# Patient Record
Sex: MALE
[De-identification: names, ages, dates, MRNs, and addresses within clinical notes are randomized per-mention and may not be internally consistent; named-entity substitution may affect disease eponyms.]

---

## 2017-08-03 ENCOUNTER — MEDICATION RENEWAL (OUTPATIENT)
Age: 37
End: 2017-08-03

## 2017-08-08 ENCOUNTER — LABORATORY RESULT (OUTPATIENT)
Age: 37
End: 2017-08-08

## 2017-08-08 ENCOUNTER — APPOINTMENT (OUTPATIENT)
Dept: INTERNAL MEDICINE | Facility: CLINIC | Age: 37
End: 2017-08-08
Payer: COMMERCIAL

## 2017-08-08 VITALS
WEIGHT: 191.6 LBS | HEIGHT: 71 IN | TEMPERATURE: 98 F | BODY MASS INDEX: 26.82 KG/M2 | DIASTOLIC BLOOD PRESSURE: 92 MMHG | SYSTOLIC BLOOD PRESSURE: 130 MMHG | OXYGEN SATURATION: 98 % | HEART RATE: 99 BPM

## 2017-08-08 DIAGNOSIS — H92.20 OTORRHAGIA, UNSPECIFIED EAR: ICD-10-CM

## 2017-08-08 DIAGNOSIS — Z86.69 PERSONAL HISTORY OF OTHER DISEASES OF THE NERVOUS SYSTEM AND SENSE ORGANS: ICD-10-CM

## 2017-08-08 PROCEDURE — 36415 COLL VENOUS BLD VENIPUNCTURE: CPT

## 2017-08-08 PROCEDURE — 99214 OFFICE O/P EST MOD 30 MIN: CPT | Mod: 25

## 2017-08-11 LAB
25(OH)D3 SERPL-MCNC: 19.2 NG/ML
ALBUMIN SERPL ELPH-MCNC: 4.6 G/DL
ALP BLD-CCNC: 71 U/L
ALT SERPL-CCNC: 71 U/L
ANION GAP SERPL CALC-SCNC: 20 MMOL/L
AST SERPL-CCNC: 34 U/L
BASOPHILS # BLD AUTO: 0.03 K/UL
BASOPHILS NFR BLD AUTO: 0.5 %
BILIRUB SERPL-MCNC: 0.7 MG/DL
BUN SERPL-MCNC: 21 MG/DL
CALCIUM SERPL-MCNC: 10.1 MG/DL
CHLORIDE SERPL-SCNC: 100 MMOL/L
CHOLEST SERPL-MCNC: 261 MG/DL
CHOLEST/HDLC SERPL: 3.5 RATIO
CO2 SERPL-SCNC: 19 MMOL/L
CREAT SERPL-MCNC: 0.98 MG/DL
EOSINOPHIL # BLD AUTO: 0.04 K/UL
EOSINOPHIL NFR BLD AUTO: 0.7 %
FOLATE SERPL-MCNC: 9.6 NG/ML
GLUCOSE SERPL-MCNC: 150 MG/DL
HBA1C MFR BLD HPLC: 5.5 %
HCT VFR BLD CALC: 45.3 %
HDLC SERPL-MCNC: 75 MG/DL
HGB BLD-MCNC: 14.9 G/DL
IMM GRANULOCYTES NFR BLD AUTO: 0.2 %
LDLC SERPL CALC-MCNC: NORMAL
LYMPHOCYTES # BLD AUTO: 1.1 K/UL
LYMPHOCYTES NFR BLD AUTO: 19.5 %
MAN DIFF?: NORMAL
MCHC RBC-ENTMCNC: 31.6 PG
MCHC RBC-ENTMCNC: 32.9 GM/DL
MCV RBC AUTO: 96 FL
MONOCYTES # BLD AUTO: 0.34 K/UL
MONOCYTES NFR BLD AUTO: 6 %
NEUTROPHILS # BLD AUTO: 4.11 K/UL
NEUTROPHILS NFR BLD AUTO: 73.1 %
PLATELET # BLD AUTO: 172 K/UL
POTASSIUM SERPL-SCNC: 4.1 MMOL/L
PROT SERPL-MCNC: 6.9 G/DL
RBC # BLD: 4.72 M/UL
RBC # FLD: 13.7 %
SODIUM SERPL-SCNC: 139 MMOL/L
T PALLIDUM AB SER QL IA: NEGATIVE
TRIGL SERPL-MCNC: 411 MG/DL
VIT B12 SERPL-MCNC: 353 PG/ML
WBC # FLD AUTO: 5.63 K/UL

## 2017-09-06 ENCOUNTER — MEDICATION RENEWAL (OUTPATIENT)
Age: 37
End: 2017-09-06

## 2017-11-22 ENCOUNTER — MEDICATION RENEWAL (OUTPATIENT)
Age: 37
End: 2017-11-22

## 2017-11-27 ENCOUNTER — MEDICATION RENEWAL (OUTPATIENT)
Age: 37
End: 2017-11-27

## 2018-01-16 ENCOUNTER — TRANSCRIPTION ENCOUNTER (OUTPATIENT)
Age: 38
End: 2018-01-16

## 2018-01-16 ENCOUNTER — NON-APPOINTMENT (OUTPATIENT)
Age: 38
End: 2018-01-16

## 2018-01-16 ENCOUNTER — APPOINTMENT (OUTPATIENT)
Dept: INTERNAL MEDICINE | Facility: CLINIC | Age: 38
End: 2018-01-16
Payer: COMMERCIAL

## 2018-01-16 VITALS
WEIGHT: 191 LBS | SYSTOLIC BLOOD PRESSURE: 126 MMHG | TEMPERATURE: 97.7 F | DIASTOLIC BLOOD PRESSURE: 96 MMHG | BODY MASS INDEX: 26.74 KG/M2 | HEART RATE: 92 BPM | OXYGEN SATURATION: 98 % | HEIGHT: 71 IN

## 2018-01-16 DIAGNOSIS — K64.4 RESIDUAL HEMORRHOIDAL SKIN TAGS: ICD-10-CM

## 2018-01-16 PROCEDURE — 36415 COLL VENOUS BLD VENIPUNCTURE: CPT

## 2018-01-16 PROCEDURE — 99395 PREV VISIT EST AGE 18-39: CPT | Mod: 25

## 2018-01-16 PROCEDURE — 93000 ELECTROCARDIOGRAM COMPLETE: CPT

## 2018-01-16 PROCEDURE — 92552 PURE TONE AUDIOMETRY AIR: CPT

## 2018-01-16 PROCEDURE — G0444 DEPRESSION SCREEN ANNUAL: CPT

## 2018-01-19 LAB
25(OH)D3 SERPL-MCNC: 17 NG/ML
ALBUMIN SERPL ELPH-MCNC: 4.3 G/DL
ALP BLD-CCNC: 68 U/L
ALT SERPL-CCNC: 66 U/L
ANION GAP SERPL CALC-SCNC: 19 MMOL/L
APPEARANCE: CLEAR
AST SERPL-CCNC: 52 U/L
BASOPHILS # BLD AUTO: 0.03 K/UL
BASOPHILS NFR BLD AUTO: 0.6 %
BILIRUB SERPL-MCNC: 0.6 MG/DL
BILIRUBIN URINE: NEGATIVE
BLOOD URINE: NEGATIVE
BUN SERPL-MCNC: 16 MG/DL
CALCIUM SERPL-MCNC: 9.6 MG/DL
CHLORIDE SERPL-SCNC: 106 MMOL/L
CHOLEST SERPL-MCNC: 230 MG/DL
CHOLEST/HDLC SERPL: 3.2 RATIO
CO2 SERPL-SCNC: 19 MMOL/L
COLOR: YELLOW
CREAT SERPL-MCNC: 0.88 MG/DL
EOSINOPHIL # BLD AUTO: 0.1 K/UL
EOSINOPHIL NFR BLD AUTO: 1.9 %
GLUCOSE QUALITATIVE U: NEGATIVE
GLUCOSE SERPL-MCNC: 102 MG/DL
HBA1C MFR BLD HPLC: 5.5 %
HCT VFR BLD CALC: 46.1 %
HCV AB SER QL: NONREACTIVE
HCV S/CO RATIO: 0.1 S/CO
HDLC SERPL-MCNC: 73 MG/DL
HGB BLD-MCNC: 14.7 G/DL
IMM GRANULOCYTES NFR BLD AUTO: 0.2 %
KETONES URINE: NEGATIVE
LDLC SERPL CALC-MCNC: 112 MG/DL
LEUKOCYTE ESTERASE URINE: NEGATIVE
LYMPHOCYTES # BLD AUTO: 1.51 K/UL
LYMPHOCYTES NFR BLD AUTO: 28 %
MAN DIFF?: NORMAL
MCHC RBC-ENTMCNC: 31.5 PG
MCHC RBC-ENTMCNC: 31.9 GM/DL
MCV RBC AUTO: 98.7 FL
MONOCYTES # BLD AUTO: 0.49 K/UL
MONOCYTES NFR BLD AUTO: 9.1 %
NEUTROPHILS # BLD AUTO: 3.26 K/UL
NEUTROPHILS NFR BLD AUTO: 60.2 %
NITRITE URINE: NEGATIVE
PH URINE: 6
PLATELET # BLD AUTO: 174 K/UL
POTASSIUM SERPL-SCNC: 3.9 MMOL/L
PROT SERPL-MCNC: 6.7 G/DL
PROTEIN URINE: NEGATIVE
PSA SERPL-MCNC: 0.46 NG/ML
RBC # BLD: 4.67 M/UL
RBC # FLD: 14.4 %
SODIUM SERPL-SCNC: 144 MMOL/L
SPECIFIC GRAVITY URINE: 1.02
T4 FREE SERPL-MCNC: 0.8 NG/DL
TRIGL SERPL-MCNC: 224 MG/DL
TSH SERPL-ACNC: 1.81 UIU/ML
UROBILINOGEN URINE: NEGATIVE
VIT B12 SERPL-MCNC: 416 PG/ML
WBC # FLD AUTO: 5.4 K/UL

## 2018-08-28 ENCOUNTER — MEDICATION RENEWAL (OUTPATIENT)
Age: 38
End: 2018-08-28

## 2018-08-31 ENCOUNTER — APPOINTMENT (OUTPATIENT)
Dept: INTERNAL MEDICINE | Facility: CLINIC | Age: 38
End: 2018-08-31
Payer: COMMERCIAL

## 2018-08-31 VITALS
TEMPERATURE: 98.2 F | SYSTOLIC BLOOD PRESSURE: 134 MMHG | DIASTOLIC BLOOD PRESSURE: 96 MMHG | HEART RATE: 98 BPM | HEIGHT: 71 IN | OXYGEN SATURATION: 99 % | WEIGHT: 191 LBS | BODY MASS INDEX: 26.74 KG/M2

## 2018-08-31 DIAGNOSIS — H69.81 OTHER SPECIFIED DISORDERS OF EUSTACHIAN TUBE, RIGHT EAR: ICD-10-CM

## 2018-08-31 DIAGNOSIS — J30.2 OTHER SEASONAL ALLERGIC RHINITIS: ICD-10-CM

## 2018-08-31 PROCEDURE — 99214 OFFICE O/P EST MOD 30 MIN: CPT | Mod: 25

## 2018-08-31 PROCEDURE — 36415 COLL VENOUS BLD VENIPUNCTURE: CPT

## 2018-08-31 RX ORDER — HYDROCHLOROTHIAZIDE 12.5 MG/1
12.5 CAPSULE ORAL DAILY
Qty: 30 | Refills: 6 | Status: DISCONTINUED | COMMUNITY
Start: 2018-01-16 | End: 2018-08-31

## 2018-08-31 NOTE — PHYSICAL EXAM
[No Acute Distress] : no acute distress [Well Nourished] : well nourished [Well Developed] : well developed [Normal Sclera/Conjunctiva] : normal sclera/conjunctiva [No Respiratory Distress] : no respiratory distress  [Clear to Auscultation] : lungs were clear to auscultation bilaterally [Normal Rate] : normal rate  [Regular Rhythm] : with a regular rhythm [No Edema] : there was no peripheral edema [Normal Affect] : the affect was normal [Alert and Oriented x3] : oriented to person, place, and time [Normal Mood] : the mood was normal [Normal Insight/Judgement] : insight and judgment were intact [de-identified] : Right TM dull, right tender cervical LAD

## 2018-08-31 NOTE — HISTORY OF PRESENT ILLNESS
[FreeTextEntry1] : follow up [de-identified] : depression\par - controlled on zoloft 200mg daily\par \par fatty liver\par - admits to increased etoh intake b/c of work functions\par - will need lfts repeated\par \par Nasal and right ear congestion\par - a/w post nasal drip\par - no fever or chills. minimal phlegm production\par \par HTN\par - home BP readings have been elevated 135-140s

## 2018-09-04 LAB
ALBUMIN SERPL ELPH-MCNC: 4.6 G/DL
ALP BLD-CCNC: 73 U/L
ALT SERPL-CCNC: 114 U/L
ANION GAP SERPL CALC-SCNC: 15 MMOL/L
AST SERPL-CCNC: 42 U/L
BILIRUB SERPL-MCNC: 0.6 MG/DL
BUN SERPL-MCNC: 17 MG/DL
CALCIUM SERPL-MCNC: 9.9 MG/DL
CHLORIDE SERPL-SCNC: 99 MMOL/L
CHOLEST SERPL-MCNC: 250 MG/DL
CHOLEST/HDLC SERPL: 3.1 RATIO
CO2 SERPL-SCNC: 23 MMOL/L
CREAT SERPL-MCNC: 0.69 MG/DL
GLUCOSE SERPL-MCNC: 95 MG/DL
HBA1C MFR BLD HPLC: 5.8 %
HDLC SERPL-MCNC: 80 MG/DL
LDLC SERPL CALC-MCNC: 127 MG/DL
POTASSIUM SERPL-SCNC: 4 MMOL/L
PROT SERPL-MCNC: 7.4 G/DL
SODIUM SERPL-SCNC: 137 MMOL/L
TRIGL SERPL-MCNC: 214 MG/DL

## 2018-09-04 RX ORDER — DICLOFENAC SODIUM 75 MG/1
75 TABLET, DELAYED RELEASE ORAL
Qty: 28 | Refills: 0 | Status: COMPLETED | COMMUNITY
Start: 2018-08-14

## 2018-09-04 RX ORDER — ATOVAQUONE AND PROGUANIL HYDROCHLORIDE 250; 100 MG/1; MG/1
250-100 TABLET, FILM COATED ORAL
Qty: 21 | Refills: 0 | Status: COMPLETED | COMMUNITY
Start: 2018-07-09

## 2018-09-04 RX ORDER — LIDOCAINE 5 G/100G
5 OINTMENT TOPICAL
Qty: 50 | Refills: 0 | Status: COMPLETED | COMMUNITY
Start: 2018-08-15

## 2018-09-04 RX ORDER — OXYCODONE AND ACETAMINOPHEN 5; 325 MG/1; MG/1
5-325 TABLET ORAL
Qty: 20 | Refills: 0 | Status: COMPLETED | COMMUNITY
Start: 2018-08-19

## 2018-09-04 RX ORDER — CYCLOBENZAPRINE HYDROCHLORIDE 10 MG/1
10 TABLET, FILM COATED ORAL
Qty: 10 | Refills: 0 | Status: COMPLETED | COMMUNITY
Start: 2018-07-27

## 2018-09-04 RX ORDER — MELOXICAM 15 MG/1
15 TABLET ORAL
Qty: 10 | Refills: 0 | Status: COMPLETED | COMMUNITY
Start: 2018-07-27

## 2018-09-04 RX ORDER — ACETAMINOPHEN AND CODEINE 300; 30 MG/1; MG/1
300-30 TABLET ORAL
Qty: 7 | Refills: 0 | Status: COMPLETED | COMMUNITY
Start: 2018-08-14

## 2018-09-04 RX ORDER — METHYLPREDNISOLONE 4 MG/1
4 TABLET ORAL
Qty: 21 | Refills: 0 | Status: COMPLETED | COMMUNITY
Start: 2018-08-16

## 2019-01-09 ENCOUNTER — APPOINTMENT (OUTPATIENT)
Dept: INTERNAL MEDICINE | Facility: CLINIC | Age: 39
End: 2019-01-09
Payer: COMMERCIAL

## 2019-01-09 ENCOUNTER — NON-APPOINTMENT (OUTPATIENT)
Age: 39
End: 2019-01-09

## 2019-01-09 VITALS
WEIGHT: 175 LBS | OXYGEN SATURATION: 99 % | HEART RATE: 82 BPM | BODY MASS INDEX: 24.5 KG/M2 | DIASTOLIC BLOOD PRESSURE: 86 MMHG | TEMPERATURE: 98.9 F | HEIGHT: 71 IN | SYSTOLIC BLOOD PRESSURE: 124 MMHG

## 2019-01-09 DIAGNOSIS — Z01.818 ENCOUNTER FOR OTHER PREPROCEDURAL EXAMINATION: ICD-10-CM

## 2019-01-09 DIAGNOSIS — Z23 ENCOUNTER FOR IMMUNIZATION: ICD-10-CM

## 2019-01-09 PROCEDURE — G0008: CPT

## 2019-01-09 PROCEDURE — 36415 COLL VENOUS BLD VENIPUNCTURE: CPT

## 2019-01-09 PROCEDURE — 93000 ELECTROCARDIOGRAM COMPLETE: CPT

## 2019-01-09 PROCEDURE — 99214 OFFICE O/P EST MOD 30 MIN: CPT | Mod: 25

## 2019-01-09 PROCEDURE — 90686 IIV4 VACC NO PRSV 0.5 ML IM: CPT

## 2019-01-10 LAB
ABO + RH PNL BLD: NORMAL
ALBUMIN SERPL ELPH-MCNC: 4.3 G/DL
ALP BLD-CCNC: 64 U/L
ALT SERPL-CCNC: 28 U/L
ANION GAP SERPL CALC-SCNC: 10 MMOL/L
APPEARANCE: CLEAR
APTT BLD: 32.4 SEC
AST SERPL-CCNC: 23 U/L
BASOPHILS # BLD AUTO: 0.03 K/UL
BASOPHILS NFR BLD AUTO: 0.5 %
BILIRUB SERPL-MCNC: 0.7 MG/DL
BILIRUBIN URINE: NEGATIVE
BLOOD URINE: NEGATIVE
BUN SERPL-MCNC: 18 MG/DL
CALCIUM SERPL-MCNC: 9.6 MG/DL
CHLORIDE SERPL-SCNC: 101 MMOL/L
CHOLEST SERPL-MCNC: 195 MG/DL
CHOLEST/HDLC SERPL: 3.1 RATIO
CO2 SERPL-SCNC: 26 MMOL/L
COLOR: YELLOW
CREAT SERPL-MCNC: 0.8 MG/DL
EOSINOPHIL # BLD AUTO: 0.1 K/UL
EOSINOPHIL NFR BLD AUTO: 1.6 %
GLUCOSE QUALITATIVE U: NEGATIVE MG/DL
GLUCOSE SERPL-MCNC: 104 MG/DL
HCT VFR BLD CALC: 46 %
HDLC SERPL-MCNC: 62 MG/DL
HGB BLD-MCNC: 14.9 G/DL
IMM GRANULOCYTES NFR BLD AUTO: 0.2 %
INR PPP: 1.08 RATIO
KETONES URINE: NEGATIVE
LDLC SERPL CALC-MCNC: 103 MG/DL
LEUKOCYTE ESTERASE URINE: NEGATIVE
LYMPHOCYTES # BLD AUTO: 1.66 K/UL
LYMPHOCYTES NFR BLD AUTO: 26.6 %
MAN DIFF?: NORMAL
MCHC RBC-ENTMCNC: 31.2 PG
MCHC RBC-ENTMCNC: 32.4 GM/DL
MCV RBC AUTO: 96.2 FL
MONOCYTES # BLD AUTO: 0.45 K/UL
MONOCYTES NFR BLD AUTO: 7.2 %
NEUTROPHILS # BLD AUTO: 3.98 K/UL
NEUTROPHILS NFR BLD AUTO: 63.9 %
NITRITE URINE: NEGATIVE
PH URINE: 6.5
PLATELET # BLD AUTO: 202 K/UL
POTASSIUM SERPL-SCNC: 3.8 MMOL/L
PROT SERPL-MCNC: 6.8 G/DL
PROTEIN URINE: NEGATIVE MG/DL
PT BLD: 12.3 SEC
RBC # BLD: 4.78 M/UL
RBC # FLD: 13.4 %
SODIUM SERPL-SCNC: 137 MMOL/L
SPECIFIC GRAVITY URINE: 1.02
TRIGL SERPL-MCNC: 149 MG/DL
UROBILINOGEN URINE: NEGATIVE MG/DL
WBC # FLD AUTO: 6.23 K/UL

## 2019-01-11 NOTE — RESULTS/DATA
[] : results reviewed [de-identified] : WNL [de-identified] : NSR at 68bpm [de-identified] : Type and Screen:  A POSITIVE.

## 2019-01-11 NOTE — ASSESSMENT
[Patient Optimized for Surgery] : Patient optimized for surgery [No Further Testing Recommended] : no further testing recommended [FreeTextEntry4] : LOW riks for LOW risk procedure. May proceed with elective surgery.

## 2019-01-11 NOTE — PHYSICAL EXAM
[No Acute Distress] : no acute distress [Well Nourished] : well nourished [Well Developed] : well developed [Well-Appearing] : well-appearing [Normal Sclera/Conjunctiva] : normal sclera/conjunctiva [No Respiratory Distress] : no respiratory distress  [Clear to Auscultation] : lungs were clear to auscultation bilaterally [No Accessory Muscle Use] : no accessory muscle use [No Edema] : there was no peripheral edema [No Joint Swelling] : no joint swelling [No Rash] : no rash [Normal Gait] : normal gait [Coordination Grossly Intact] : coordination grossly intact [No Focal Deficits] : no focal deficits [Normal Affect] : the affect was normal [Alert and Oriented x3] : oriented to person, place, and time [Normal Mood] : the mood was normal [Normal Insight/Judgement] : insight and judgment were intact [de-identified] : right inguinal bulge/hernia

## 2019-01-11 NOTE — HISTORY OF PRESENT ILLNESS
[No Pertinent Cardiac History] : no history of aortic stenosis, atrial fibrillation, coronary artery disease, recent myocardial infarction, or implantable device/pacemaker [No Pertinent Pulmonary History] : no history of asthma, COPD, sleep apnea, or smoking [No Adverse Anesthesia Reaction] : no adverse anesthesia reaction in self or family member [Excellent (>10 METs)] : Excellent (>10 METs) [Chronic Anticoagulation] : no chronic anticoagulation [Chronic Kidney Disease] : no chronic kidney disease [Diabetes] : no diabetes [FreeTextEntry1] : right inguinal hernia repair [FreeTextEntry2] : TBD [FreeTextEntry3] : Dr. Alon Burrell [FreeTextEntry4] : Right inguinal bulge that is painful. requires surgical repair.

## 2019-02-14 ENCOUNTER — MEDICATION RENEWAL (OUTPATIENT)
Age: 39
End: 2019-02-14

## 2019-02-19 ENCOUNTER — MEDICATION RENEWAL (OUTPATIENT)
Age: 39
End: 2019-02-19

## 2019-06-28 ENCOUNTER — TRANSCRIPTION ENCOUNTER (OUTPATIENT)
Age: 39
End: 2019-06-28

## 2019-09-17 ENCOUNTER — RX RENEWAL (OUTPATIENT)
Age: 39
End: 2019-09-17

## 2019-10-03 ENCOUNTER — RX RENEWAL (OUTPATIENT)
Age: 39
End: 2019-10-03

## 2020-01-30 ENCOUNTER — APPOINTMENT (OUTPATIENT)
Dept: INTERNAL MEDICINE | Facility: CLINIC | Age: 40
End: 2020-01-30
Payer: COMMERCIAL

## 2020-01-30 ENCOUNTER — NON-APPOINTMENT (OUTPATIENT)
Age: 40
End: 2020-01-30

## 2020-01-30 VITALS
HEIGHT: 71 IN | OXYGEN SATURATION: 99 % | TEMPERATURE: 98.2 F | BODY MASS INDEX: 25.34 KG/M2 | WEIGHT: 181 LBS | SYSTOLIC BLOOD PRESSURE: 123 MMHG | HEART RATE: 78 BPM | DIASTOLIC BLOOD PRESSURE: 80 MMHG

## 2020-01-30 DIAGNOSIS — R10.11 RIGHT UPPER QUADRANT PAIN: ICD-10-CM

## 2020-01-30 DIAGNOSIS — R94.5 ABNORMAL RESULTS OF LIVER FUNCTION STUDIES: ICD-10-CM

## 2020-01-30 DIAGNOSIS — Z87.898 PERSONAL HISTORY OF OTHER SPECIFIED CONDITIONS: ICD-10-CM

## 2020-01-30 PROCEDURE — 93000 ELECTROCARDIOGRAM COMPLETE: CPT

## 2020-01-30 PROCEDURE — 36415 COLL VENOUS BLD VENIPUNCTURE: CPT

## 2020-01-30 PROCEDURE — 99395 PREV VISIT EST AGE 18-39: CPT | Mod: 25

## 2020-01-30 PROCEDURE — 99214 OFFICE O/P EST MOD 30 MIN: CPT | Mod: 25

## 2020-01-30 RX ORDER — FLUTICASONE PROPIONATE 50 UG/1
50 SPRAY, METERED NASAL DAILY
Qty: 1 | Refills: 3 | Status: DISCONTINUED | COMMUNITY
Start: 2018-08-31 | End: 2020-01-30

## 2020-01-30 NOTE — HEALTH RISK ASSESSMENT
[Excellent] : ~his/her~  mood as  excellent [] : No [0] : 2) Feeling down, depressed, or hopeless: Not at all (0) [No] : In the past 12 months have you used drugs other than those required for medical reasons? No [Language] : denies difficulty with language [Change in mental status noted] : No change in mental status noted [[id number]] : 0 [Behavior] : denies difficulty with behavior [Learning/Retaining New Information] : denies difficulty learning/retaining new information [Handling Complex Tasks] : denies difficulty handling complex tasks [Spatial Ability and Orientation] : denies difficulty with spatial ability and orientation [Reasoning] : denies difficulty with reasoning [Fully functional (bathing, dressing, toileting, transferring, walking, feeding)] : Fully functional (bathing, dressing, toileting, transferring, walking, feeding) [None] : None [Fully functional (using the telephone, shopping, preparing meals, housekeeping, doing laundry, using] : Fully functional and needs no help or supervision to perform IADLs (using the telephone, shopping, preparing meals, housekeeping, doing laundry, using transportation, managing medications and managing finances) [Reports changes in vision] : Reports no changes in vision [Reports changes in hearing] : Reports no changes in hearing [With Patient/Caregiver] : With Patient/Caregiver [AdvancecareDate] : 01/30/2020

## 2020-01-30 NOTE — HISTORY OF PRESENT ILLNESS
[de-identified] : RUQ pain\par - started 2 weeks ago, lasted 2 days\par - occurred while in [place] for work event\par - [facility]\par - pain was worse w/ deep breath, non radiating\par - a/w loose stools, no fever or chills, nausea or vomiting. \par \par annual\par - is now s/p inguinal hernia repair\par \par HTN\par - compliant w/ HCTZ\par - would like to stop medication if possible\par \par Obesity\par - has gained weight since earlier this month\par \par elevated LFTs\par - had resulted after last visit\par \par B12 and Vit D def\par - takes OTC MVI in stead of individual supplements\par \par HCM\par - up to date. [FreeTextEntry1] : annual exam

## 2020-01-30 NOTE — PHYSICAL EXAM
[No Acute Distress] : no acute distress [Well Nourished] : well nourished [Normal Sclera/Conjunctiva] : normal sclera/conjunctiva [Well Developed] : well developed [Well-Appearing] : well-appearing [Normal Outer Ear/Nose] : the outer ears and nose were normal in appearance [EOMI] : extraocular movements intact [No Lymphadenopathy] : no lymphadenopathy [Thyroid Normal, No Nodules] : the thyroid was normal and there were no nodules present [Supple] : supple [No Accessory Muscle Use] : no accessory muscle use [No Respiratory Distress] : no respiratory distress  [Normal Rate] : normal rate  [Clear to Auscultation] : lungs were clear to auscultation bilaterally [Regular Rhythm] : with a regular rhythm [No Murmur] : no murmur heard [Normal S1, S2] : normal S1 and S2 [No Varicosities] : no varicosities [Pedal Pulses Present] : the pedal pulses are present [No Palpable Aorta] : no palpable aorta [No Edema] : there was no peripheral edema [Soft] : abdomen soft [Non Tender] : non-tender [Non-distended] : non-distended [No Masses] : no abdominal mass palpated [No HSM] : no HSM [Normal Bowel Sounds] : normal bowel sounds [Normal Supraclavicular Nodes] : no supraclavicular lymphadenopathy [Normal Anterior Cervical Nodes] : no anterior cervical lymphadenopathy [No Joint Swelling] : no joint swelling [Grossly Normal Strength/Tone] : grossly normal strength/tone [No Rash] : no rash [Coordination Grossly Intact] : coordination grossly intact [No Focal Deficits] : no focal deficits [Normal Gait] : normal gait [Alert and Oriented x3] : oriented to person, place, and time [Normal Affect] : the affect was normal [Normal Mood] : the mood was normal [Normal Insight/Judgement] : insight and judgment were intact

## 2020-01-30 NOTE — HEALTH RISK ASSESSMENT
[Excellent] : ~his/her~  mood as  excellent [] : No [0] : 2) Feeling down, depressed, or hopeless: Not at all (0) [No] : In the past 12 months have you used drugs other than those required for medical reasons? No [Change in mental status noted] : No change in mental status noted [[id number]] : 0 [Language] : denies difficulty with language [Behavior] : denies difficulty with behavior [Learning/Retaining New Information] : denies difficulty learning/retaining new information [Handling Complex Tasks] : denies difficulty handling complex tasks [Reasoning] : denies difficulty with reasoning [Spatial Ability and Orientation] : denies difficulty with spatial ability and orientation [None] : None [Fully functional (using the telephone, shopping, preparing meals, housekeeping, doing laundry, using] : Fully functional and needs no help or supervision to perform IADLs (using the telephone, shopping, preparing meals, housekeeping, doing laundry, using transportation, managing medications and managing finances) [Fully functional (bathing, dressing, toileting, transferring, walking, feeding)] : Fully functional (bathing, dressing, toileting, transferring, walking, feeding) [Reports changes in vision] : Reports no changes in vision [Reports changes in hearing] : Reports no changes in hearing [With Patient/Caregiver] : With Patient/Caregiver [AdvancecareDate] : 01/30/2020

## 2020-01-30 NOTE — PHYSICAL EXAM
[No Acute Distress] : no acute distress [Well Nourished] : well nourished [Normal Sclera/Conjunctiva] : normal sclera/conjunctiva [Well Developed] : well developed [Well-Appearing] : well-appearing [EOMI] : extraocular movements intact [Normal Outer Ear/Nose] : the outer ears and nose were normal in appearance [No Lymphadenopathy] : no lymphadenopathy [Thyroid Normal, No Nodules] : the thyroid was normal and there were no nodules present [Supple] : supple [No Respiratory Distress] : no respiratory distress  [No Accessory Muscle Use] : no accessory muscle use [Normal Rate] : normal rate  [Clear to Auscultation] : lungs were clear to auscultation bilaterally [Regular Rhythm] : with a regular rhythm [No Murmur] : no murmur heard [Normal S1, S2] : normal S1 and S2 [No Varicosities] : no varicosities [Pedal Pulses Present] : the pedal pulses are present [No Palpable Aorta] : no palpable aorta [No Edema] : there was no peripheral edema [Soft] : abdomen soft [Non Tender] : non-tender [Non-distended] : non-distended [No Masses] : no abdominal mass palpated [No HSM] : no HSM [Normal Supraclavicular Nodes] : no supraclavicular lymphadenopathy [Normal Bowel Sounds] : normal bowel sounds [Normal Anterior Cervical Nodes] : no anterior cervical lymphadenopathy [No Joint Swelling] : no joint swelling [Grossly Normal Strength/Tone] : grossly normal strength/tone [No Rash] : no rash [Coordination Grossly Intact] : coordination grossly intact [No Focal Deficits] : no focal deficits [Normal Gait] : normal gait [Alert and Oriented x3] : oriented to person, place, and time [Normal Affect] : the affect was normal [Normal Mood] : the mood was normal [Normal Insight/Judgement] : insight and judgment were intact

## 2020-01-30 NOTE — HISTORY OF PRESENT ILLNESS
[de-identified] : RUQ pain\par - started 2 weeks ago, lasted 2 days\par - occurred while in [place] for work event\par - [facility]\par - pain was worse w/ deep breath, non radiating\par - a/w loose stools, no fever or chills, nausea or vomiting. \par \par annual\par - is now s/p inguinal hernia repair\par \par HTN\par - compliant w/ HCTZ\par - would like to stop medication if possible\par \par Obesity\par - has gained weight since earlier this month\par \par elevated LFTs\par - had resulted after last visit\par \par B12 and Vit D def\par - takes OTC MVI in stead of individual supplements\par \par HCM\par - up to date. [FreeTextEntry1] : annual exam

## 2020-01-31 ENCOUNTER — TRANSCRIPTION ENCOUNTER (OUTPATIENT)
Age: 40
End: 2020-01-31

## 2020-01-31 LAB
25(OH)D3 SERPL-MCNC: 26.8 NG/ML
ALBUMIN SERPL ELPH-MCNC: 4.8 G/DL
ALP BLD-CCNC: 66 U/L
ALT SERPL-CCNC: 28 U/L
AMYLASE/CREAT SERPL: 80 U/L
ANION GAP SERPL CALC-SCNC: 13 MMOL/L
APPEARANCE: CLEAR
AST SERPL-CCNC: 22 U/L
BASOPHILS # BLD AUTO: 0.04 K/UL
BASOPHILS NFR BLD AUTO: 0.8 %
BILIRUB SERPL-MCNC: 0.8 MG/DL
BILIRUBIN URINE: NEGATIVE
BLOOD URINE: NEGATIVE
BUN SERPL-MCNC: 16 MG/DL
CALCIUM SERPL-MCNC: 10.1 MG/DL
CHLORIDE SERPL-SCNC: 101 MMOL/L
CHOLEST SERPL-MCNC: 231 MG/DL
CHOLEST/HDLC SERPL: 3.5 RATIO
CO2 SERPL-SCNC: 23 MMOL/L
COLOR: NORMAL
CREAT SERPL-MCNC: 0.79 MG/DL
CRP SERPL-MCNC: 0.15 MG/DL
EOSINOPHIL # BLD AUTO: 0.1 K/UL
EOSINOPHIL NFR BLD AUTO: 2 %
ESTIMATED AVERAGE GLUCOSE: 108 MG/DL
GGT SERPL-CCNC: 27 U/L
GLUCOSE QUALITATIVE U: NEGATIVE
GLUCOSE SERPL-MCNC: 98 MG/DL
HBA1C MFR BLD HPLC: 5.4 %
HCT VFR BLD CALC: 48.7 %
HCV AB SER QL: NONREACTIVE
HCV S/CO RATIO: 0.15 S/CO
HDLC SERPL-MCNC: 67 MG/DL
HGB BLD-MCNC: 15.9 G/DL
IMM GRANULOCYTES NFR BLD AUTO: 0.2 %
KETONES URINE: NEGATIVE
LDLC SERPL CALC-MCNC: 126 MG/DL
LEUKOCYTE ESTERASE URINE: NEGATIVE
LPL SERPL-CCNC: 37 U/L
LYMPHOCYTES # BLD AUTO: 1.66 K/UL
LYMPHOCYTES NFR BLD AUTO: 33.2 %
MAN DIFF?: NORMAL
MCHC RBC-ENTMCNC: 32.6 GM/DL
MCHC RBC-ENTMCNC: 32.9 PG
MCV RBC AUTO: 100.6 FL
MONOCYTES # BLD AUTO: 0.58 K/UL
MONOCYTES NFR BLD AUTO: 11.6 %
NEUTROPHILS # BLD AUTO: 2.61 K/UL
NEUTROPHILS NFR BLD AUTO: 52.2 %
NITRITE URINE: NEGATIVE
PH URINE: 7
PLATELET # BLD AUTO: 180 K/UL
POTASSIUM SERPL-SCNC: 3.9 MMOL/L
PROT SERPL-MCNC: 7 G/DL
PROTEIN URINE: NEGATIVE
PSA SERPL-MCNC: 1.25 NG/ML
RBC # BLD: 4.84 M/UL
RBC # FLD: 13.1 %
SODIUM SERPL-SCNC: 137 MMOL/L
SPECIFIC GRAVITY URINE: 1.01
T4 FREE SERPL-MCNC: 0.9 NG/DL
TRIGL SERPL-MCNC: 190 MG/DL
TSH SERPL-ACNC: 2.45 UIU/ML
UROBILINOGEN URINE: NORMAL
VIT B12 SERPL-MCNC: 488 PG/ML
WBC # FLD AUTO: 5 K/UL

## 2020-02-03 ENCOUNTER — TRANSCRIPTION ENCOUNTER (OUTPATIENT)
Age: 40
End: 2020-02-03

## 2020-02-18 ENCOUNTER — RX RENEWAL (OUTPATIENT)
Age: 40
End: 2020-02-18

## 2020-03-02 ENCOUNTER — TRANSCRIPTION ENCOUNTER (OUTPATIENT)
Age: 40
End: 2020-03-02

## 2020-03-02 DIAGNOSIS — K76.0 FATTY (CHANGE OF) LIVER, NOT ELSEWHERE CLASSIFIED: ICD-10-CM

## 2021-01-05 ENCOUNTER — RX RENEWAL (OUTPATIENT)
Age: 41
End: 2021-01-05

## 2021-06-03 ENCOUNTER — APPOINTMENT (OUTPATIENT)
Dept: INTERNAL MEDICINE | Facility: CLINIC | Age: 41
End: 2021-06-03
Payer: COMMERCIAL

## 2021-06-03 ENCOUNTER — NON-APPOINTMENT (OUTPATIENT)
Age: 41
End: 2021-06-03

## 2021-06-03 VITALS
WEIGHT: 174 LBS | DIASTOLIC BLOOD PRESSURE: 86 MMHG | HEART RATE: 71 BPM | TEMPERATURE: 98.6 F | OXYGEN SATURATION: 98 % | BODY MASS INDEX: 24.36 KG/M2 | SYSTOLIC BLOOD PRESSURE: 134 MMHG | HEIGHT: 71 IN

## 2021-06-03 DIAGNOSIS — Z00.00 ENCOUNTER FOR GENERAL ADULT MEDICAL EXAMINATION W/OUT ABNORMAL FINDINGS: ICD-10-CM

## 2021-06-03 DIAGNOSIS — E78.1 PURE HYPERGLYCERIDEMIA: ICD-10-CM

## 2021-06-03 DIAGNOSIS — N52.9 MALE ERECTILE DYSFUNCTION, UNSPECIFIED: ICD-10-CM

## 2021-06-03 PROCEDURE — 93000 ELECTROCARDIOGRAM COMPLETE: CPT

## 2021-06-03 PROCEDURE — 36415 COLL VENOUS BLD VENIPUNCTURE: CPT

## 2021-06-03 PROCEDURE — 99072 ADDL SUPL MATRL&STAF TM PHE: CPT

## 2021-06-03 PROCEDURE — 99395 PREV VISIT EST AGE 18-39: CPT | Mod: 25

## 2021-06-03 NOTE — PHYSICAL EXAM
[No Acute Distress] : no acute distress [Well Nourished] : well nourished [Well Developed] : well developed [Well-Appearing] : well-appearing [Normal Sclera/Conjunctiva] : normal sclera/conjunctiva [EOMI] : extraocular movements intact [Normal Outer Ear/Nose] : the outer ears and nose were normal in appearance [No Respiratory Distress] : no respiratory distress  [No Accessory Muscle Use] : no accessory muscle use [Clear to Auscultation] : lungs were clear to auscultation bilaterally [Normal Rate] : normal rate  [Regular Rhythm] : with a regular rhythm [Normal S1, S2] : normal S1 and S2 [No Murmur] : no murmur heard [No Edema] : there was no peripheral edema [No Extremity Clubbing/Cyanosis] : no extremity clubbing/cyanosis [Soft] : abdomen soft [Non Tender] : non-tender [Non-distended] : non-distended [No Masses] : no abdominal mass palpated [No HSM] : no HSM [Normal Bowel Sounds] : normal bowel sounds [No CVA Tenderness] : no CVA  tenderness [No Joint Swelling] : no joint swelling [Grossly Normal Strength/Tone] : grossly normal strength/tone [No Rash] : no rash [Coordination Grossly Intact] : coordination grossly intact [No Focal Deficits] : no focal deficits [Normal Gait] : normal gait [Normal Affect] : the affect was normal [Alert and Oriented x3] : oriented to person, place, and time [Normal Mood] : the mood was normal [Normal Insight/Judgement] : insight and judgment were intact

## 2021-06-03 NOTE — HISTORY OF PRESENT ILLNESS
[FreeTextEntry1] : annual exam [de-identified] : androgen deficiency\par - previously on TRT w/ bi monthly injections\par - TRT at one point 95 and 138\par - has been off for about 3-4 yrs\par - c/o decreased libido and erectile dysfunction\par - has 2 children, is not concerned about fertility does not wish to have more children\par \par HTN\par - continues to be borderline despite hctz 25mg\par \par depression\par - zoloft now down to 100mg daily no longer 200mg\par \par HCM\par - covid vax completed

## 2021-06-08 ENCOUNTER — TRANSCRIPTION ENCOUNTER (OUTPATIENT)
Age: 41
End: 2021-06-08

## 2021-06-11 LAB
25(OH)D3 SERPL-MCNC: 42.6 NG/ML
ALBUMIN SERPL ELPH-MCNC: 4.7 G/DL
ALP BLD-CCNC: 79 U/L
ALT SERPL-CCNC: 26 U/L
ANION GAP SERPL CALC-SCNC: 16 MMOL/L
APPEARANCE: CLEAR
AST SERPL-CCNC: 22 U/L
BASOPHILS # BLD AUTO: 0.04 K/UL
BASOPHILS NFR BLD AUTO: 0.6 %
BILIRUB SERPL-MCNC: 0.8 MG/DL
BILIRUBIN URINE: NEGATIVE
BLOOD URINE: NEGATIVE
BUN SERPL-MCNC: 23 MG/DL
CALCIUM SERPL-MCNC: 9.9 MG/DL
CHLORIDE SERPL-SCNC: 99 MMOL/L
CHOLEST SERPL-MCNC: 262 MG/DL
CO2 SERPL-SCNC: 22 MMOL/L
COLOR: YELLOW
CREAT SERPL-MCNC: 0.83 MG/DL
EOSINOPHIL # BLD AUTO: 0.13 K/UL
EOSINOPHIL NFR BLD AUTO: 2 %
ESTIMATED AVERAGE GLUCOSE: 111 MG/DL
ESTRADIOL SERPL-MCNC: 12 PG/ML
FSH SERPL-MCNC: 4.8 IU/L
GLUCOSE QUALITATIVE U: NEGATIVE
GLUCOSE SERPL-MCNC: 102 MG/DL
HBA1C MFR BLD HPLC: 5.5 %
HCT VFR BLD CALC: 49.1 %
HDLC SERPL-MCNC: 83 MG/DL
HGB BLD-MCNC: 15.9 G/DL
IMM GRANULOCYTES NFR BLD AUTO: 0.2 %
KETONES URINE: NEGATIVE
LDLC SERPL CALC-MCNC: 126 MG/DL
LEUKOCYTE ESTERASE URINE: NEGATIVE
LH SERPL-ACNC: 3.9 IU/L
LYMPHOCYTES # BLD AUTO: 1.67 K/UL
LYMPHOCYTES NFR BLD AUTO: 25.6 %
MAN DIFF?: NORMAL
MCHC RBC-ENTMCNC: 31.4 PG
MCHC RBC-ENTMCNC: 32.4 GM/DL
MCV RBC AUTO: 96.8 FL
MONOCYTES # BLD AUTO: 0.65 K/UL
MONOCYTES NFR BLD AUTO: 10 %
NEUTROPHILS # BLD AUTO: 4.03 K/UL
NEUTROPHILS NFR BLD AUTO: 61.6 %
NITRITE URINE: NEGATIVE
NONHDLC SERPL-MCNC: 179 MG/DL
PH URINE: 6.5
PLATELET # BLD AUTO: 208 K/UL
POTASSIUM SERPL-SCNC: 3.8 MMOL/L
PROLACTIN SERPL-MCNC: 11 NG/ML
PROT SERPL-MCNC: 7.3 G/DL
PROTEIN URINE: NEGATIVE
PSA SERPL-MCNC: 0.61 NG/ML
RBC # BLD: 5.07 M/UL
RBC # FLD: 13.2 %
SODIUM SERPL-SCNC: 137 MMOL/L
SPECIFIC GRAVITY URINE: 1.02
TESTOST SERPL-MCNC: 368 NG/DL
TRIGL SERPL-MCNC: 261 MG/DL
TSH SERPL-ACNC: 2.03 UIU/ML
UROBILINOGEN URINE: NORMAL
VIT B12 SERPL-MCNC: 836 PG/ML
WBC # FLD AUTO: 6.53 K/UL

## 2021-07-09 ENCOUNTER — RX RENEWAL (OUTPATIENT)
Age: 41
End: 2021-07-09

## 2021-07-09 RX ORDER — SYRINGE W-NEEDLE,DISPOSAB,3 ML 22GX1"
22G X 1" SYRINGE, EMPTY DISPOSABLE MISCELLANEOUS
Qty: 4 | Refills: 6 | Status: ACTIVE | COMMUNITY
Start: 2021-06-11 | End: 1900-01-01

## 2021-08-06 ENCOUNTER — TRANSCRIPTION ENCOUNTER (OUTPATIENT)
Age: 41
End: 2021-08-06

## 2021-08-06 ENCOUNTER — RX RENEWAL (OUTPATIENT)
Age: 41
End: 2021-08-06

## 2021-10-01 ENCOUNTER — RX RENEWAL (OUTPATIENT)
Age: 41
End: 2021-10-01

## 2021-11-01 ENCOUNTER — RX RENEWAL (OUTPATIENT)
Age: 41
End: 2021-11-01

## 2021-12-03 ENCOUNTER — RX RENEWAL (OUTPATIENT)
Age: 41
End: 2021-12-03

## 2021-12-27 ENCOUNTER — RX RENEWAL (OUTPATIENT)
Age: 41
End: 2021-12-27

## 2022-01-01 ENCOUNTER — RX RENEWAL (OUTPATIENT)
Age: 42
End: 2022-01-01

## 2022-01-27 ENCOUNTER — RX RENEWAL (OUTPATIENT)
Age: 42
End: 2022-01-27

## 2022-02-09 ENCOUNTER — APPOINTMENT (OUTPATIENT)
Dept: INTERNAL MEDICINE | Facility: CLINIC | Age: 42
End: 2022-02-09
Payer: COMMERCIAL

## 2022-02-09 VITALS
DIASTOLIC BLOOD PRESSURE: 84 MMHG | HEART RATE: 64 BPM | WEIGHT: 174 LBS | HEIGHT: 71 IN | BODY MASS INDEX: 24.36 KG/M2 | OXYGEN SATURATION: 98 % | SYSTOLIC BLOOD PRESSURE: 142 MMHG | TEMPERATURE: 98.6 F

## 2022-02-09 PROCEDURE — 99214 OFFICE O/P EST MOD 30 MIN: CPT | Mod: 25

## 2022-02-09 PROCEDURE — 36415 COLL VENOUS BLD VENIPUNCTURE: CPT

## 2022-02-09 RX ORDER — B-COMPLEX WITH VITAMIN C
TABLET ORAL DAILY
Qty: 90 | Refills: 3 | Status: COMPLETED | COMMUNITY
Start: 2017-08-11 | End: 2022-02-09

## 2022-02-09 NOTE — HISTORY OF PRESENT ILLNESS
[FreeTextEntry1] : Testosterone f/u [de-identified] : TRT\par - c/o decreased testicular size\par - would like vasectomy, already has 2 children\par - read online about HCG to maintain testicular volume\par - has been inj. into right thigh w/o issue\par - has been using sterile syringes provided by  for dog's injections.\par

## 2022-02-11 ENCOUNTER — TRANSCRIPTION ENCOUNTER (OUTPATIENT)
Age: 42
End: 2022-02-11

## 2022-02-11 LAB
ALBUMIN SERPL ELPH-MCNC: 5.1 G/DL
ALP BLD-CCNC: 51 U/L
ALT SERPL-CCNC: 48 U/L
ANION GAP SERPL CALC-SCNC: 17 MMOL/L
AST SERPL-CCNC: 31 U/L
BASOPHILS # BLD AUTO: 0.06 K/UL
BASOPHILS NFR BLD AUTO: 0.9 %
BILIRUB SERPL-MCNC: 1 MG/DL
BUN SERPL-MCNC: 23 MG/DL
CALCIUM SERPL-MCNC: 10.9 MG/DL
CHLORIDE SERPL-SCNC: 101 MMOL/L
CHOLEST SERPL-MCNC: 231 MG/DL
CO2 SERPL-SCNC: 23 MMOL/L
CREAT SERPL-MCNC: 1.03 MG/DL
EOSINOPHIL # BLD AUTO: 0.11 K/UL
EOSINOPHIL NFR BLD AUTO: 1.7 %
ESTRADIOL SERPL-MCNC: 9 PG/ML
GLUCOSE SERPL-MCNC: 105 MG/DL
HCT VFR BLD CALC: 57.9 %
HDLC SERPL-MCNC: 72 MG/DL
HGB BLD-MCNC: 18.5 G/DL
IMM GRANULOCYTES NFR BLD AUTO: 0.8 %
LDLC SERPL CALC-MCNC: 133 MG/DL
LYMPHOCYTES # BLD AUTO: 1.92 K/UL
LYMPHOCYTES NFR BLD AUTO: 30.1 %
MAN DIFF?: NORMAL
MCHC RBC-ENTMCNC: 31.8 PG
MCHC RBC-ENTMCNC: 32 GM/DL
MCV RBC AUTO: 99.5 FL
MONOCYTES # BLD AUTO: 0.74 K/UL
MONOCYTES NFR BLD AUTO: 11.6 %
NEUTROPHILS # BLD AUTO: 3.49 K/UL
NEUTROPHILS NFR BLD AUTO: 54.9 %
NONHDLC SERPL-MCNC: 159 MG/DL
PLATELET # BLD AUTO: 195 K/UL
POTASSIUM SERPL-SCNC: 5.1 MMOL/L
PROT SERPL-MCNC: 7.4 G/DL
PSA SERPL-MCNC: 0.7 NG/ML
RBC # BLD: 5.82 M/UL
RBC # FLD: 14.3 %
SODIUM SERPL-SCNC: 142 MMOL/L
TESTOST SERPL-MCNC: 686 NG/DL
TRIGL SERPL-MCNC: 131 MG/DL
WBC # FLD AUTO: 6.37 K/UL

## 2022-02-25 ENCOUNTER — TRANSCRIPTION ENCOUNTER (OUTPATIENT)
Age: 42
End: 2022-02-25

## 2022-02-25 DIAGNOSIS — Z86.69 PERSONAL HISTORY OF OTHER DISEASES OF THE NERVOUS SYSTEM AND SENSE ORGANS: ICD-10-CM

## 2022-02-25 DIAGNOSIS — F17.200 NICOTINE DEPENDENCE, UNSPECIFIED, UNCOMPLICATED: ICD-10-CM

## 2022-03-04 ENCOUNTER — RX RENEWAL (OUTPATIENT)
Age: 42
End: 2022-03-04

## 2022-03-07 ENCOUNTER — TRANSCRIPTION ENCOUNTER (OUTPATIENT)
Age: 42
End: 2022-03-07

## 2022-04-07 ENCOUNTER — RX RENEWAL (OUTPATIENT)
Age: 42
End: 2022-04-07

## 2022-04-27 ENCOUNTER — APPOINTMENT (OUTPATIENT)
Dept: INTERNAL MEDICINE | Facility: CLINIC | Age: 42
End: 2022-04-27
Payer: COMMERCIAL

## 2022-04-27 VITALS
WEIGHT: 182 LBS | BODY MASS INDEX: 25.48 KG/M2 | HEIGHT: 71 IN | DIASTOLIC BLOOD PRESSURE: 87 MMHG | OXYGEN SATURATION: 99 % | TEMPERATURE: 98.1 F | SYSTOLIC BLOOD PRESSURE: 133 MMHG | HEART RATE: 83 BPM

## 2022-04-27 PROCEDURE — 99214 OFFICE O/P EST MOD 30 MIN: CPT

## 2022-04-27 NOTE — PHYSICAL EXAM
[Normal] : no acute distress, well nourished, well developed and well-appearing [No Respiratory Distress] : no respiratory distress  [Normal Sclera/Conjunctiva] : normal sclera/conjunctiva [EOMI] : extraocular movements intact [Urethral Meatus] : meatus normal [Penis Abnormality] : normal uncircumcised penis [FreeTextEntry1] : erythematous/violacious plaque dorsum of glans, well demarcated, not raised [de-identified] : decreased lateral range of motion LUE

## 2022-04-27 NOTE — HISTORY OF PRESENT ILLNESS
[FreeTextEntry8] : left shoulder and right elbow pain\par - started about 2 months ago\par - son jumped on him while he was doing bench press\par - weight load was shifted to right arm completely \par - has been in PT for 2 months w/o improvement\par - they recommend MRI\par - pain worse w/ lateral extension\par \par penile lesion\par - c/w pruritic lesion over glans\par - is uncircumcised, denies white film/discharge

## 2022-04-28 ENCOUNTER — TRANSCRIPTION ENCOUNTER (OUTPATIENT)
Age: 42
End: 2022-04-28

## 2022-05-02 ENCOUNTER — RX RENEWAL (OUTPATIENT)
Age: 42
End: 2022-05-02

## 2022-05-09 ENCOUNTER — RX RENEWAL (OUTPATIENT)
Age: 42
End: 2022-05-09

## 2022-05-11 ENCOUNTER — TRANSCRIPTION ENCOUNTER (OUTPATIENT)
Age: 42
End: 2022-05-11

## 2022-05-12 ENCOUNTER — APPOINTMENT (OUTPATIENT)
Dept: ORTHOPEDIC SURGERY | Facility: CLINIC | Age: 42
End: 2022-05-12
Payer: COMMERCIAL

## 2022-05-12 VITALS — HEIGHT: 70 IN | WEIGHT: 180 LBS | BODY MASS INDEX: 25.77 KG/M2

## 2022-05-12 DIAGNOSIS — M75.112 INCOMPLETE ROTATOR CUFF TEAR OR RUPTURE OF LEFT SHOULDER, NOT SPECIFIED AS TRAUMATIC: ICD-10-CM

## 2022-05-12 PROCEDURE — 99204 OFFICE O/P NEW MOD 45 MIN: CPT

## 2022-05-12 NOTE — ASSESSMENT
[FreeTextEntry1] : 41-year-old with LEFT shoulder pain and RIGHT elbow pain for about 2 months after working out doing a bench press in which he dropped the weight suddenly.  In the right elbow he has an exam consistent with lateral epicondylitis.  On the left shoulder there is a tiny focal partial-thickness rotator cuff tear and mild bursitis and there appears to be some mild edema on the anterior lateral acromion which corresponds to where he has focal tenderness.  It looks like a small bone bruise.\par For the shoulder I recommended that he stop the physical therapy and just continue doing some gentle strengthening of the rotator cuff and periscapular muscles on his own.  No bench press or shoulder press.  He can apply ice.  No deep massage or pressure where it is tender.  We will see if the pain goes away in the next month.\par For the elbow I recommended starting physical therapy for that.  He can do some heat, ice, stretching and eccentric strengthening.\par He can apply some Voltaren gel or take some anti-inflammatories like ibuprofen or Aleve.\par Follow-up in about 4 to 6 weeks to see if he is improving.

## 2022-05-12 NOTE — PHYSICAL EXAM
[UE] : Sensory: Intact in bilateral upper extremities [Rad] : radial 2+ and symmetric bilaterally [Normal RUE] : Right Upper Extremity: No scars, rashes, lesions, ulcers, skin intact [Normal LUE] : Left Upper Extremity: No scars, rashes, lesions, ulcers, skin intact [Normal Touch] : sensation intact for touch [Normal] : Gait: normal [de-identified] : Left shoulder with right for comparison\par Cervical spine range of motion is intact without pain.\par No edema, ecchymoses, erythema, deformity.\par Point focal tenderness at the anterior lateral acromion that seems to reproduce his pain.\par No other significant tenderness including the AC joint.\par Shoulder range of motion is with 180 degrees forward elevation with mild discomfort.  Internal rotation to T9 with mild pain.  External rotation to 60 degrees.\par 5/5 supraspinatus, internal rotation, external rotation, biceps.\par Minimal discomfort with Neer and Perez test and speeds test.\par \par Left elbow\par Focal tenderness on the lateral epicondyle.\par Pain is reproduced with resisted middle finger extension.\par Range of motion 0 to 150 degrees.  90 degrees pronation supination.\par 5/5 biceps and triceps.\par Motor and sensation are intact distally.\par \par  [de-identified] : \par X-ray of the right elbow 3 views on April 27, 2022  unremarkable.\par \par X-rays of the left shoulder internal and external and Y lateral views performed at [facility] radiology were unremarkable.  No calcifications or bone lesions\par \par MRI of the left shoulder performed May 5, 2022 showed a low-grade, less than 50% articular surface partial-thickness, partial width tear of the infraspinatus measuring about 0.3 cm.  Subscapularis and supraspinatus are normal.  Nondisplaced tear at the posterior superior labrum.  Mild subacromial bursitis.  Findings that may be consistent with adhesive capsulitis.\par Not reported on is some edema around the acromion which corresponds to his site of tenderness.  There may be a small amount of edema in the acromion as well consistent with a bone bruise.  The edema is on the insertion of the deltoid on the acromion

## 2022-05-12 NOTE — HISTORY OF PRESENT ILLNESS
[de-identified] : 42 yo gentleman presents for pain in her LEFT shoulder that started 2 months ago when he was working out in his home gym. He was doing bench press when his son ran in tapped his LEFT shoulder and collapsed it down then weight dropped on to RIGHT arm. He did not feel a pop or feel that he shoulder went out of place. Pain is 0/10 at rest, only having pain with abduction and tender to the touch over top of shoulder. \par He is also having RIGHT elbow pain since the same incident. He localizes this pain to lateral elbow and feels it when carrying things.\par He went to see his PCP and got RIGHT elbow xray, LEFT shoulder xray on 4/27/22 and MRI of LEFT shoulder on 5/5/22. He has been doing physical therapy for his LEFT shoulder for 2 months working primarily on the shoulder.  With only minor improvement maybe 20-30%.\par Pain in the left shoulder is really localized and pinpoint on top of the anterior shoulder.  Elbow pain is also relatively localized to the bone.\par His pain at rest is 0 out of 10 but worse with activities.\par He takes occasional Tylenol and Advil.

## 2022-05-12 NOTE — REASON FOR VISIT
[Initial Visit] : an initial visit for [Shoulder Pain] : shoulder pain [Shoulder Injury] : shoulder injury [FreeTextEntry2] : Elbow pain

## 2022-06-08 ENCOUNTER — APPOINTMENT (OUTPATIENT)
Dept: ORTHOPEDIC SURGERY | Facility: CLINIC | Age: 42
End: 2022-06-08
Payer: COMMERCIAL

## 2022-06-08 DIAGNOSIS — S46.812A STRAIN OF OTHER MUSCLES, FASCIA AND TENDONS AT SHOULDER AND UPPER ARM LEVEL, LEFT ARM, INITIAL ENCOUNTER: ICD-10-CM

## 2022-06-08 PROCEDURE — 99214 OFFICE O/P EST MOD 30 MIN: CPT

## 2022-06-08 NOTE — HISTORY OF PRESENT ILLNESS
[de-identified] : 42 yo gentleman presents for pain in her LEFT shoulder that started 3 months ago when he was working out in his home gym doing bench press when his son ran in tapped his LEFT shoulder and collapsed it down then weight dropped on to RIGHT arm. Pain is 0/10 at rest, only having pain with abduction and tender to the touch over top of shoulder. He has been doing PT and his shoulder is feeling a little better but still has pain with abduction. He has been doing physical therapy for his LEFT shoulder for 2 months with only minor improvement maybe 50%. He takes occasional Tylenol and Advil.  He has rested the shoulder more and has been focusing physical therapy on the right elbow where he has lateral epicondylitis.\par He is also having RIGHT elbow pain since the same incident and that has not gotten any better. He is doing PT for it but pain is still the same. Pain with gripping.\par

## 2022-06-08 NOTE — ASSESSMENT
[FreeTextEntry1] : 41-year-old with LEFT shoulder pain and RIGHT elbow pain for about 3 months after working out doing a bench press in which he dropped the weight suddenly.\par In his left shoulder I think he had a strain of the anterior to mid deltoid of the acromion.  There is no defect palpated and no real tears seen on the MRI but there is a small amount of edema in the lateral acromion in that area is tenderness.  She will get better.  I do not think it something that would need surgery.  He should avoid any painful lifting but can do some rotator cuff and scapular exercises below shoulder height.  Light lifting.  He can do biceps and triceps.\par On his right side he has lateral epicondylitis and may have strained the common extensor tendon origin.  This has not improved yet.  Its common that this condition can linger for a while.  We could consider a steroid injection but studies show that patients in general do better without steroid in the long run so I would prefer not to if we can get him better without it.  I referred him for diagnostic ultrasound of both his left shoulder and right elbow if pain is ongoing he can continue therapy on the right elbow.  He should do a lot of core and other strengthening exercises.  He should avoid activities or exercises that aggravate the shoulder or elbow\par He also will try an anti-inflammatory.  Celebrex was prescribed since it has lower risk of GI bleed and he can take 1 a day for the next few weeks.\par Follow-up in 6 to 8 weeks if he is not better.

## 2022-06-08 NOTE — PHYSICAL EXAM
[UE] : Sensory: Intact in bilateral upper extremities [Normal RUE] : Right Upper Extremity: No scars, rashes, lesions, ulcers, skin intact [Normal LUE] : Left Upper Extremity: No scars, rashes, lesions, ulcers, skin intact [Normal Touch] : sensation intact for touch [Normal] : Gait: normal [Rad] : radial 2+ and symmetric bilaterally [de-identified] : Left shoulder with right for comparison\par Cervical spine range of motion is intact without pain.\par No edema, ecchymoses, erythema, deformity.\par Point focal tenderness at the anterior lateral acromion that seems to reproduce his pain.  Similar to last visit\par No other significant tenderness including the AC joint.\par Shoulder range of motion is with 180 degrees forward elevation with mild discomfort.  Internal rotation to T9 with mild pain.  External rotation to 60 degrees.\par 5/5 supraspinatus, internal rotation, external rotation, biceps.  Mild pain with shoulder abduction/deltoid strength testing\par Minimal discomfort with Neer and Perez test and speeds test.\par \par Left elbow\par Focal tenderness on the lateral epicondyle.\par Pain is reproduced with resisted middle finger extension and gripping\par Range of motion 0 to 150 degrees.  90 degrees pronation supination.\par 5/5 biceps and triceps.\par Motor and sensation are intact distally.\par \par  [de-identified] : \par \par X-ray of RIGHT elbow 3 views on 4/27/22 unremarkable\par \par X-[facility] of LEFT shoulder at [facility] were unremarkable. No calcifications or bone lesions.\par \par MRI of LEFT shoulder 5/5/22 showed a low grade, less than 50% articular surface partial thickness, partial width tear of the infraspinatus measuring about .3 cm. Subscapularis and supraspinatus are normal. Nondisplaced tear at the posterior superior labrum. Mild subacromial bursitis. Finding may be consistent with adhesive capsulitis. Not reported on is some edema around the acromion which corresponds to his site of tenderness. There may be a small amount of edema in the acromion as well consistent with a bone bruise. The edema is on the insertion of the deltoid of the acromion.\par

## 2022-06-08 NOTE — REASON FOR VISIT
[Follow-Up Visit] : a follow-up visit for [Shoulder Pain] : shoulder pain [Shoulder Injury] : shoulder injury [FreeTextEntry2] : elbow pain

## 2022-06-14 ENCOUNTER — RX RENEWAL (OUTPATIENT)
Age: 42
End: 2022-06-14

## 2022-07-15 ENCOUNTER — RX RENEWAL (OUTPATIENT)
Age: 42
End: 2022-07-15

## 2022-07-20 ENCOUNTER — APPOINTMENT (OUTPATIENT)
Dept: ORTHOPEDIC SURGERY | Facility: CLINIC | Age: 42
End: 2022-07-20

## 2022-08-15 ENCOUNTER — RX RENEWAL (OUTPATIENT)
Age: 42
End: 2022-08-15

## 2022-09-22 ENCOUNTER — RX RENEWAL (OUTPATIENT)
Age: 42
End: 2022-09-22

## 2022-09-26 ENCOUNTER — RX RENEWAL (OUTPATIENT)
Age: 42
End: 2022-09-26

## 2022-10-10 ENCOUNTER — APPOINTMENT (OUTPATIENT)
Dept: INTERNAL MEDICINE | Facility: CLINIC | Age: 42
End: 2022-10-10

## 2022-10-10 VITALS
DIASTOLIC BLOOD PRESSURE: 72 MMHG | SYSTOLIC BLOOD PRESSURE: 122 MMHG | OXYGEN SATURATION: 97 % | HEART RATE: 92 BPM | TEMPERATURE: 98.9 F | HEIGHT: 70 IN | BODY MASS INDEX: 26.48 KG/M2 | WEIGHT: 185 LBS

## 2022-10-10 DIAGNOSIS — E55.9 VITAMIN D DEFICIENCY, UNSPECIFIED: ICD-10-CM

## 2022-10-10 DIAGNOSIS — E53.8 DEFICIENCY OF OTHER SPECIFIED B GROUP VITAMINS: ICD-10-CM

## 2022-10-10 DIAGNOSIS — R73.03 PREDIABETES.: ICD-10-CM

## 2022-10-10 PROCEDURE — 36415 COLL VENOUS BLD VENIPUNCTURE: CPT

## 2022-10-10 PROCEDURE — 99213 OFFICE O/P EST LOW 20 MIN: CPT | Mod: 25

## 2022-10-10 RX ORDER — MOXIFLOXACIN OPHTHALMIC 5 MG/ML
0.5 SOLUTION/ DROPS OPHTHALMIC
Qty: 3 | Refills: 0 | Status: COMPLETED | COMMUNITY
Start: 2022-09-22

## 2022-10-10 RX ORDER — CELECOXIB 200 MG/1
200 CAPSULE ORAL DAILY
Qty: 30 | Refills: 3 | Status: ACTIVE | COMMUNITY
Start: 2022-06-08 | End: 1900-01-01

## 2022-10-10 RX ORDER — FLUCONAZOLE 200 MG/1
200 TABLET ORAL
Qty: 8 | Refills: 0 | Status: COMPLETED | COMMUNITY
Start: 2022-06-21 | End: 2022-10-10

## 2022-10-10 RX ORDER — TACROLIMUS 1 MG/G
0.1 OINTMENT TOPICAL
Qty: 30 | Refills: 0 | Status: COMPLETED | COMMUNITY
Start: 2022-06-29

## 2022-10-10 RX ORDER — KETOCONAZOLE 20 MG/G
2 CREAM TOPICAL
Qty: 30 | Refills: 0 | Status: COMPLETED | COMMUNITY
Start: 2022-05-26 | End: 2022-10-10

## 2022-10-10 RX ORDER — CICLOPIROX OLAMINE 7.7 MG/G
0.77 CREAM TOPICAL
Qty: 30 | Refills: 0 | Status: COMPLETED | COMMUNITY
Start: 2022-06-02 | End: 2022-10-10

## 2022-10-10 RX ORDER — NYSTATIN AND TRIAMCINOLONE ACETONIDE 100000; 1 MG/G; MG/G
100000-0.1 CREAM TOPICAL TWICE DAILY
Qty: 1 | Refills: 0 | Status: COMPLETED | COMMUNITY
Start: 2022-04-27 | End: 2022-10-10

## 2022-10-10 RX ORDER — SILDENAFIL 100 MG/1
100 TABLET, FILM COATED ORAL
Qty: 10 | Refills: 11 | Status: ACTIVE | COMMUNITY
Start: 2021-06-03 | End: 1900-01-01

## 2022-10-10 RX ORDER — POLYMYXIN B SULFATE AND TRIMETHOPRIM 10000; 1 [USP'U]/ML; MG/ML
10000-0.1 SOLUTION OPHTHALMIC
Qty: 10 | Refills: 0 | Status: COMPLETED | COMMUNITY
Start: 2022-06-11

## 2022-10-10 NOTE — HISTORY OF PRESENT ILLNESS
[FreeTextEntry1] : follow up [de-identified] : chronic penile lesion\par - initially treated as balanitis\par - sx persisted, was evaluated by local Uro in [place]\par - treated w/ 3 months of topical steroid/antifungal\par - was evaluated by second uro in [place], todl to stop topical medication and apply aquaphore \par - notices continued discoloration and would like uro evaluation\par \par androgen deficiency\par - feels much better on TRT\par - concerned about testicular atrophy\par - discussed clomid and possible TRT holidays

## 2022-10-10 NOTE — PHYSICAL EXAM
[Normal] : no acute distress, well nourished, well developed and well-appearing [No Respiratory Distress] : no respiratory distress  [Urethral Meatus] : meatus normal [Penis Abnormality] : normal uncircumcised penis

## 2022-10-12 ENCOUNTER — TRANSCRIPTION ENCOUNTER (OUTPATIENT)
Age: 42
End: 2022-10-12

## 2022-10-12 LAB
ALBUMIN SERPL ELPH-MCNC: 4.9 G/DL
ALP BLD-CCNC: 59 U/L
ALT SERPL-CCNC: 39 U/L
ANION GAP SERPL CALC-SCNC: 13 MMOL/L
AST SERPL-CCNC: 36 U/L
BASOPHILS # BLD AUTO: 0.05 K/UL
BASOPHILS NFR BLD AUTO: 0.8 %
BILIRUB SERPL-MCNC: 0.6 MG/DL
BUN SERPL-MCNC: 22 MG/DL
CALCIUM SERPL-MCNC: 9.4 MG/DL
CHLORIDE SERPL-SCNC: 101 MMOL/L
CO2 SERPL-SCNC: 25 MMOL/L
CREAT SERPL-MCNC: 1.05 MG/DL
EGFR: 92 ML/MIN/1.73M2
EOSINOPHIL # BLD AUTO: 0.08 K/UL
EOSINOPHIL NFR BLD AUTO: 1.3 %
ESTRADIOL SERPL-MCNC: 22 PG/ML
GLUCOSE SERPL-MCNC: 104 MG/DL
HCT VFR BLD CALC: 53.2 %
HGB BLD-MCNC: 17.9 G/DL
IMM GRANULOCYTES NFR BLD AUTO: 0.3 %
LYMPHOCYTES # BLD AUTO: 1.48 K/UL
LYMPHOCYTES NFR BLD AUTO: 23.9 %
MAN DIFF?: NORMAL
MCHC RBC-ENTMCNC: 32.4 PG
MCHC RBC-ENTMCNC: 33.6 GM/DL
MCV RBC AUTO: 96.4 FL
MONOCYTES # BLD AUTO: 0.77 K/UL
MONOCYTES NFR BLD AUTO: 12.4 %
NEUTROPHILS # BLD AUTO: 3.79 K/UL
NEUTROPHILS NFR BLD AUTO: 61.3 %
PLATELET # BLD AUTO: 186 K/UL
POTASSIUM SERPL-SCNC: 3.5 MMOL/L
PROT SERPL-MCNC: 7.2 G/DL
PSA SERPL-MCNC: 0.84 NG/ML
RBC # BLD: 5.52 M/UL
RBC # FLD: 13.4 %
SODIUM SERPL-SCNC: 139 MMOL/L
TESTOST SERPL-MCNC: 657 NG/DL
WBC # FLD AUTO: 6.19 K/UL

## 2022-11-03 ENCOUNTER — NON-APPOINTMENT (OUTPATIENT)
Age: 42
End: 2022-11-03

## 2022-11-03 ENCOUNTER — APPOINTMENT (OUTPATIENT)
Dept: UROLOGY | Facility: CLINIC | Age: 42
End: 2022-11-03

## 2022-11-03 VITALS — TEMPERATURE: 98.4 F | SYSTOLIC BLOOD PRESSURE: 145 MMHG | DIASTOLIC BLOOD PRESSURE: 94 MMHG | HEART RATE: 76 BPM

## 2022-11-03 PROCEDURE — 99203 OFFICE O/P NEW LOW 30 MIN: CPT

## 2022-11-03 NOTE — ASSESSMENT
[FreeTextEntry1] : recurrent balanits\par multiple courses of appropriately selected topicals with no improvement\par reveiwed role of circ - hesistant\par refer to derm\par if fails for cric

## 2022-11-03 NOTE — PHYSICAL EXAM
[Urethral Meatus] : meatus normal [Penis Abnormality] : normal uncircumcised penis [Urinary Bladder Findings] : the bladder was normal on palpation [Scrotum] : the scrotum was normal [FreeTextEntry1] : balanitis

## 2022-11-03 NOTE — HISTORY OF PRESENT ILLNESS
[FreeTextEntry1] : 41M hx balanitis\par multiple atmepts with topicals\par nystatin vs ketoconazole vs betamethasone vs tacrolimus\par seen by multiple urologists\par given oral fluconazole x 8 weeks\par

## 2022-11-22 ENCOUNTER — RX RENEWAL (OUTPATIENT)
Age: 42
End: 2022-11-22

## 2022-12-04 ENCOUNTER — RX RENEWAL (OUTPATIENT)
Age: 42
End: 2022-12-04

## 2022-12-18 ENCOUNTER — RX RENEWAL (OUTPATIENT)
Age: 42
End: 2022-12-18

## 2022-12-28 ENCOUNTER — RX RENEWAL (OUTPATIENT)
Age: 42
End: 2022-12-28

## 2022-12-28 RX ORDER — SODIUM FLUORIDE 1.1 G/100G
1.1 GEL, DENTIFRICE ORAL
Qty: 102 | Refills: 1 | Status: ACTIVE | COMMUNITY
Start: 2022-11-22 | End: 1900-01-01

## 2022-12-28 RX ORDER — AMLODIPINE BESYLATE 5 MG/1
5 TABLET ORAL
Qty: 180 | Refills: 3 | Status: ACTIVE | COMMUNITY
Start: 2022-07-19

## 2023-01-04 ENCOUNTER — RX RENEWAL (OUTPATIENT)
Age: 43
End: 2023-01-04

## 2023-01-11 ENCOUNTER — NON-APPOINTMENT (OUTPATIENT)
Age: 43
End: 2023-01-11

## 2023-01-18 ENCOUNTER — APPOINTMENT (OUTPATIENT)
Dept: INTERNAL MEDICINE | Facility: CLINIC | Age: 43
End: 2023-01-18
Payer: COMMERCIAL

## 2023-01-18 VITALS
DIASTOLIC BLOOD PRESSURE: 87 MMHG | SYSTOLIC BLOOD PRESSURE: 148 MMHG | TEMPERATURE: 98.9 F | HEART RATE: 87 BPM | BODY MASS INDEX: 26.77 KG/M2 | HEIGHT: 70 IN | OXYGEN SATURATION: 99 % | WEIGHT: 187 LBS

## 2023-01-18 DIAGNOSIS — R10.30 LOWER ABDOMINAL PAIN, UNSPECIFIED: ICD-10-CM

## 2023-01-18 DIAGNOSIS — Z86.39 PERSONAL HISTORY OF OTHER ENDOCRINE, NUTRITIONAL AND METABOLIC DISEASE: ICD-10-CM

## 2023-01-18 PROCEDURE — 99213 OFFICE O/P EST LOW 20 MIN: CPT

## 2023-01-18 RX ORDER — LOSARTAN POTASSIUM 50 MG/1
50 TABLET, FILM COATED ORAL TWICE DAILY
Refills: 0 | Status: ACTIVE | COMMUNITY
Start: 2023-01-18

## 2023-01-18 RX ORDER — HYDROCHLOROTHIAZIDE 25 MG/1
25 TABLET ORAL DAILY
Qty: 90 | Refills: 3 | Status: COMPLETED | COMMUNITY
Start: 2018-08-31 | End: 2023-01-18

## 2023-01-19 PROBLEM — Z86.39 HISTORY OF OBESITY: Status: RESOLVED | Noted: 2017-08-08 | Resolved: 2023-01-19

## 2023-01-19 PROBLEM — R10.30 UNILATERAL GROIN PAIN: Status: ACTIVE | Noted: 2023-01-19

## 2023-01-19 NOTE — PHYSICAL EXAM
[No Respiratory Distress] : no respiratory distress  [Clear to Auscultation] : lungs were clear to auscultation bilaterally [Normal Rate] : normal rate  [Regular Rhythm] : with a regular rhythm [No Edema] : there was no peripheral edema [Normal] : soft, non-tender, non-distended, no masses palpated, no HSM and normal bowel sounds [Scrotum] : the scrotum was normal [Testes Mass (___cm)] : there were no testicular masses [Grossly Normal Strength/Tone] : grossly normal strength/tone [de-identified] : + pain with resisted hip flexion

## 2023-01-19 NOTE — HISTORY OF PRESENT ILLNESS
[FreeTextEntry8] : Pt is a 41 y/o M with PMHx of HTN who presents to the office today for evaluation of abdominal pain x 1.5 weeks\par \par  abdominal pain:\par - 1.5 week ago pain began in suprapubic area. Saw surgeon on Friday to r/o possibly a new hernia\par - since that time, his pain has improved but is localized to R groin and buttock- worse with valsava\par - pt states he had been working out with heavier weights than usual and doing squats.\par - describes it as soreness\par - has been taking advil which provides relief\par - denies fever, chills, nausea, vomiting, diarrhea,rash, skin changes, bulging, weakness

## 2023-02-01 ENCOUNTER — RX RENEWAL (OUTPATIENT)
Age: 43
End: 2023-02-01

## 2023-02-03 ENCOUNTER — RX RENEWAL (OUTPATIENT)
Age: 43
End: 2023-02-03

## 2023-03-01 ENCOUNTER — APPOINTMENT (OUTPATIENT)
Dept: INTERNAL MEDICINE | Facility: CLINIC | Age: 43
End: 2023-03-01
Payer: COMMERCIAL

## 2023-03-01 VITALS
DIASTOLIC BLOOD PRESSURE: 87 MMHG | BODY MASS INDEX: 25.62 KG/M2 | OXYGEN SATURATION: 99 % | HEART RATE: 91 BPM | WEIGHT: 179 LBS | SYSTOLIC BLOOD PRESSURE: 142 MMHG | HEIGHT: 70 IN | TEMPERATURE: 97.8 F

## 2023-03-01 VITALS — SYSTOLIC BLOOD PRESSURE: 125 MMHG | DIASTOLIC BLOOD PRESSURE: 80 MMHG

## 2023-03-01 DIAGNOSIS — N50.0 ATROPHY OF TESTIS: ICD-10-CM

## 2023-03-01 DIAGNOSIS — Z87.39 PERSONAL HISTORY OF OTHER DISEASES OF THE MUSCULOSKELETAL SYSTEM AND CONNECTIVE TISSUE: ICD-10-CM

## 2023-03-01 DIAGNOSIS — Z87.438 PERSONAL HISTORY OF OTHER DISEASES OF MALE GENITAL ORGANS: ICD-10-CM

## 2023-03-01 PROCEDURE — 99214 OFFICE O/P EST MOD 30 MIN: CPT | Mod: 25

## 2023-03-01 PROCEDURE — 36415 COLL VENOUS BLD VENIPUNCTURE: CPT

## 2023-03-01 NOTE — HISTORY OF PRESENT ILLNESS
[FreeTextEntry1] : follow up [de-identified] : TRT\par - on t cypionate 200mg q 2 weeks\par - clomid 50mg PO\par \par EtOH intake\par - would like to cut down\par - increased quantity may be related to work stressors\par - would like to try naltrexone\par \par depression/anxiety\par - previously on zoloft 200mg\par - has been off for some months\par - discussed, may be contributing to increased etoh use\par - agrees to resuming medication.\par

## 2023-03-03 ENCOUNTER — TRANSCRIPTION ENCOUNTER (OUTPATIENT)
Age: 43
End: 2023-03-03

## 2023-03-03 LAB
ALBUMIN SERPL ELPH-MCNC: 4.5 G/DL
ALP BLD-CCNC: 54 U/L
ALT SERPL-CCNC: 31 U/L
ANION GAP SERPL CALC-SCNC: 17 MMOL/L
AST SERPL-CCNC: 23 U/L
BILIRUB SERPL-MCNC: 0.5 MG/DL
BUN SERPL-MCNC: 18 MG/DL
CALCIUM SERPL-MCNC: 9.7 MG/DL
CHLORIDE SERPL-SCNC: 103 MMOL/L
CHOLEST SERPL-MCNC: 186 MG/DL
CO2 SERPL-SCNC: 20 MMOL/L
CREAT SERPL-MCNC: 0.86 MG/DL
EGFR: 112 ML/MIN/1.73M2
ESTRADIOL SERPL-MCNC: 9 PG/ML
GGT SERPL-CCNC: 24 U/L
GLUCOSE SERPL-MCNC: 104 MG/DL
HCT VFR BLD CALC: 47 %
HDLC SERPL-MCNC: 73 MG/DL
HGB BLD-MCNC: 15.8 G/DL
LDLC SERPL CALC-MCNC: 67 MG/DL
NONHDLC SERPL-MCNC: 114 MG/DL
POTASSIUM SERPL-SCNC: 4 MMOL/L
PROT SERPL-MCNC: 6.6 G/DL
PSA SERPL-MCNC: 0.56 NG/ML
SODIUM SERPL-SCNC: 139 MMOL/L
TESTOST SERPL-MCNC: 387 NG/DL
TRIGL SERPL-MCNC: 233 MG/DL

## 2023-03-27 ENCOUNTER — RX RENEWAL (OUTPATIENT)
Age: 43
End: 2023-03-27

## 2023-05-05 ENCOUNTER — RX RENEWAL (OUTPATIENT)
Age: 43
End: 2023-05-05

## 2023-06-07 ENCOUNTER — APPOINTMENT (OUTPATIENT)
Dept: INTERNAL MEDICINE | Facility: CLINIC | Age: 43
End: 2023-06-07

## 2023-06-12 ENCOUNTER — RX RENEWAL (OUTPATIENT)
Age: 43
End: 2023-06-12

## 2023-06-26 ENCOUNTER — RX RENEWAL (OUTPATIENT)
Age: 43
End: 2023-06-26

## 2023-07-10 ENCOUNTER — RX RENEWAL (OUTPATIENT)
Age: 43
End: 2023-07-10

## 2023-07-20 ENCOUNTER — RX RENEWAL (OUTPATIENT)
Age: 43
End: 2023-07-20

## 2023-09-04 ENCOUNTER — RX RENEWAL (OUTPATIENT)
Age: 43
End: 2023-09-04

## 2023-10-05 ENCOUNTER — RX RENEWAL (OUTPATIENT)
Age: 43
End: 2023-10-05

## 2023-11-09 ENCOUNTER — RX RENEWAL (OUTPATIENT)
Age: 43
End: 2023-11-09

## 2023-12-14 ENCOUNTER — RX RENEWAL (OUTPATIENT)
Age: 43
End: 2023-12-14

## 2024-01-17 ENCOUNTER — RX RENEWAL (OUTPATIENT)
Age: 44
End: 2024-01-17

## 2024-02-20 ENCOUNTER — RX RENEWAL (OUTPATIENT)
Age: 44
End: 2024-02-20

## 2024-03-10 ENCOUNTER — RX RENEWAL (OUTPATIENT)
Age: 44
End: 2024-03-10

## 2024-03-10 RX ORDER — ERGOCALCIFEROL 1.25 MG/1
1.25 MG CAPSULE, LIQUID FILLED ORAL
Qty: 12 | Refills: 3 | Status: ACTIVE | COMMUNITY
Start: 2017-08-10 | End: 1900-01-01

## 2024-03-26 ENCOUNTER — RX RENEWAL (OUTPATIENT)
Age: 44
End: 2024-03-26

## 2024-04-14 ENCOUNTER — RESULT CHARGE (OUTPATIENT)
Age: 44
End: 2024-04-14

## 2024-04-15 ENCOUNTER — NON-APPOINTMENT (OUTPATIENT)
Age: 44
End: 2024-04-15

## 2024-04-15 ENCOUNTER — APPOINTMENT (OUTPATIENT)
Dept: INTERNAL MEDICINE | Facility: CLINIC | Age: 44
End: 2024-04-15
Payer: COMMERCIAL

## 2024-04-15 VITALS
OXYGEN SATURATION: 98 % | SYSTOLIC BLOOD PRESSURE: 123 MMHG | BODY MASS INDEX: 26.48 KG/M2 | HEIGHT: 70 IN | HEART RATE: 62 BPM | WEIGHT: 185 LBS | DIASTOLIC BLOOD PRESSURE: 74 MMHG | TEMPERATURE: 97.7 F | RESPIRATION RATE: 18 BRPM

## 2024-04-15 DIAGNOSIS — Z79.890 HORMONE REPLACEMENT THERAPY: ICD-10-CM

## 2024-04-15 DIAGNOSIS — K64.9 UNSPECIFIED HEMORRHOIDS: ICD-10-CM

## 2024-04-15 DIAGNOSIS — I10 ESSENTIAL (PRIMARY) HYPERTENSION: ICD-10-CM

## 2024-04-15 DIAGNOSIS — T14.8XXA OTHER INJURY OF UNSPECIFIED BODY REGION, INITIAL ENCOUNTER: ICD-10-CM

## 2024-04-15 DIAGNOSIS — E29.1 TESTICULAR HYPOFUNCTION: ICD-10-CM

## 2024-04-15 DIAGNOSIS — Z23 ENCOUNTER FOR IMMUNIZATION: ICD-10-CM

## 2024-04-15 DIAGNOSIS — Z87.891 PERSONAL HISTORY OF NICOTINE DEPENDENCE: ICD-10-CM

## 2024-04-15 DIAGNOSIS — F10.10 ALCOHOL ABUSE, UNCOMPLICATED: ICD-10-CM

## 2024-04-15 DIAGNOSIS — F32.A DEPRESSION, UNSPECIFIED: ICD-10-CM

## 2024-04-15 DIAGNOSIS — G89.29 LOW BACK PAIN, UNSPECIFIED: ICD-10-CM

## 2024-04-15 DIAGNOSIS — M54.50 LOW BACK PAIN, UNSPECIFIED: ICD-10-CM

## 2024-04-15 PROCEDURE — 99396 PREV VISIT EST AGE 40-64: CPT

## 2024-04-15 PROCEDURE — 36415 COLL VENOUS BLD VENIPUNCTURE: CPT

## 2024-04-15 PROCEDURE — 93000 ELECTROCARDIOGRAM COMPLETE: CPT

## 2024-04-15 PROCEDURE — 99214 OFFICE O/P EST MOD 30 MIN: CPT | Mod: 25

## 2024-04-15 RX ORDER — BUPROPION HYDROCHLORIDE 150 MG/1
150 TABLET, EXTENDED RELEASE ORAL DAILY
Refills: 0 | Status: ACTIVE | COMMUNITY
Start: 2024-04-15

## 2024-04-15 RX ORDER — NALTREXONE HYDROCHLORIDE 50 MG/1
50 TABLET, FILM COATED ORAL DAILY
Qty: 90 | Refills: 3 | Status: COMPLETED | COMMUNITY
Start: 2023-03-01 | End: 2024-04-15

## 2024-04-15 RX ORDER — LIDOCAINE 5% 700 MG/1
5 PATCH TOPICAL
Qty: 1 | Refills: 0 | Status: ACTIVE | COMMUNITY
Start: 2024-04-15 | End: 1900-01-01

## 2024-04-15 RX ORDER — CLOMIPHENE CITRATE 50 MG/1
50 TABLET ORAL
Qty: 16 | Refills: 5 | Status: COMPLETED | COMMUNITY
Start: 2022-02-09 | End: 2024-04-15

## 2024-04-15 RX ORDER — PHENYLEPHRINE HYDROCHLORIDE 6.25 MG/1
0.25 SUPPOSITORY RECTAL
Qty: 1 | Refills: 1 | Status: ACTIVE | COMMUNITY
Start: 2024-04-15 | End: 1900-01-01

## 2024-04-15 RX ORDER — SERTRALINE HYDROCHLORIDE 100 MG/1
100 TABLET, FILM COATED ORAL
Qty: 180 | Refills: 3 | Status: COMPLETED | COMMUNITY
Start: 2020-02-18 | End: 2024-04-15

## 2024-04-15 RX ORDER — NEBIVOLOL 2.5 MG/1
2.5 TABLET ORAL
Qty: 90 | Refills: 3 | Status: ACTIVE | COMMUNITY
Start: 2024-04-15

## 2024-04-15 NOTE — PHYSICAL EXAM
[No Acute Distress] : no acute distress [Well Developed] : well developed [Normal Sclera/Conjunctiva] : normal sclera/conjunctiva [Normal Outer Ear/Nose] : the outer ears and nose were normal in appearance [No Edema] : there was no peripheral edema [No Focal Deficits] : no focal deficits [Normal] : affect was normal and insight and judgment were intact [de-identified] : negqative straight leg raise b/l

## 2024-04-15 NOTE — HISTORY OF PRESENT ILLNESS
[FreeTextEntry1] :  annual physical [de-identified] : Pt is a 42 y/o M with PMHx of HTN, depression, hypogonadism who presents to the office today for an annual physical  lower back pain: - 6 months- improved with physical therapy but still present  - soreness, 4/10 in pain - no change with physical activity - L hip pain occasionally "sharp" - no trauma, weakness, bowel or bladder changes  Hemorrhoids: - painful, itchy, heaviness - tried OTC medication preparation H cream - has had hemorrhoids in the past  - sometimes some specks of blood with wiping - feels like it is "inside"  HCM - Covid vaccine: original and recent booster - Influenza vaccine:  UTD - Colonoscopy: a year ago.  polyps (2-3). Dr. Solano, 5 years retur - Ophthalmology:  UTD - Dentist:  [facility] - Derm: UTD

## 2024-04-15 NOTE — HEALTH RISK ASSESSMENT
[Never] : Never [Patient reported colonoscopy was normal] : Patient reported colonoscopy was normal [Hepatitis C test offered] : Hepatitis C test offered [Fully functional (bathing, dressing, toileting, transferring, walking, feeding)] : Fully functional (bathing, dressing, toileting, transferring, walking, feeding) [Fully functional (using the telephone, shopping, preparing meals, housekeeping, doing laundry, using] : Fully functional and needs no help or supervision to perform IADLs (using the telephone, shopping, preparing meals, housekeeping, doing laundry, using transportation, managing medications and managing finances) [Former] : Former [0-4] : 0-4 [PHQ-2 Negative - No further assessment needed] : PHQ-2 Negative - No further assessment needed [[id number]] : 0 [Reports changes in hearing] : Reports no changes in hearing [Reports changes in vision] : Reports no changes in vision

## 2024-04-16 ENCOUNTER — TRANSCRIPTION ENCOUNTER (OUTPATIENT)
Age: 44
End: 2024-04-16

## 2024-04-16 LAB
ALBUMIN SERPL ELPH-MCNC: 4.5 G/DL
ALP BLD-CCNC: 56 U/L
ALT SERPL-CCNC: 35 U/L
ANION GAP SERPL CALC-SCNC: 15 MMOL/L
APPEARANCE: CLEAR
AST SERPL-CCNC: 25 U/L
BACTERIA: NEGATIVE /HPF
BASOPHILS # BLD AUTO: 0.04 K/UL
BASOPHILS NFR BLD AUTO: 0.6 %
BILIRUB SERPL-MCNC: 0.6 MG/DL
BILIRUBIN URINE: NEGATIVE
BLOOD URINE: NEGATIVE
BUN SERPL-MCNC: 17 MG/DL
CALCIUM SERPL-MCNC: 9.6 MG/DL
CAST: 0 /LPF
CHLORIDE SERPL-SCNC: 104 MMOL/L
CHOLEST SERPL-MCNC: 171 MG/DL
CO2 SERPL-SCNC: 22 MMOL/L
COLOR: YELLOW
CREAT SERPL-MCNC: 1.09 MG/DL
EGFR: 87 ML/MIN/1.73M2
EOSINOPHIL # BLD AUTO: 0.1 K/UL
EOSINOPHIL NFR BLD AUTO: 1.6 %
EPITHELIAL CELLS: 0 /HPF
ESTIMATED AVERAGE GLUCOSE: 108 MG/DL
GLUCOSE QUALITATIVE U: NEGATIVE MG/DL
GLUCOSE SERPL-MCNC: 99 MG/DL
HBA1C MFR BLD HPLC: 5.4 %
HCT VFR BLD CALC: 46.9 %
HCV AB SER QL: NONREACTIVE
HCV S/CO RATIO: 0.07 S/CO
HDLC SERPL-MCNC: 60 MG/DL
HGB BLD-MCNC: 15.5 G/DL
IMM GRANULOCYTES NFR BLD AUTO: 0.2 %
KETONES URINE: NEGATIVE MG/DL
LDLC SERPL CALC-MCNC: 89 MG/DL
LEUKOCYTE ESTERASE URINE: NEGATIVE
LYMPHOCYTES # BLD AUTO: 1.93 K/UL
LYMPHOCYTES NFR BLD AUTO: 30.4 %
MAN DIFF?: NORMAL
MCHC RBC-ENTMCNC: 32 PG
MCHC RBC-ENTMCNC: 33 GM/DL
MCV RBC AUTO: 96.9 FL
MICROSCOPIC-UA: NORMAL
MONOCYTES # BLD AUTO: 0.7 K/UL
MONOCYTES NFR BLD AUTO: 11 %
NEUTROPHILS # BLD AUTO: 3.57 K/UL
NEUTROPHILS NFR BLD AUTO: 56.2 %
NITRITE URINE: NEGATIVE
NONHDLC SERPL-MCNC: 111 MG/DL
PH URINE: 6
PLATELET # BLD AUTO: 183 K/UL
POTASSIUM SERPL-SCNC: 4.5 MMOL/L
PROT SERPL-MCNC: 6.7 G/DL
PROTEIN URINE: NEGATIVE MG/DL
PSA SERPL-MCNC: 0.68 NG/ML
RBC # BLD: 4.84 M/UL
RBC # FLD: 13.6 %
RED BLOOD CELLS URINE: 0 /HPF
SODIUM SERPL-SCNC: 142 MMOL/L
SPECIFIC GRAVITY URINE: 1.01
TRIGL SERPL-MCNC: 130 MG/DL
TSH SERPL-ACNC: 2.47 UIU/ML
UROBILINOGEN URINE: 0.2 MG/DL
WBC # FLD AUTO: 6.35 K/UL
WHITE BLOOD CELLS URINE: 0 /HPF

## 2024-04-16 RX ORDER — HYDROCORTISONE ACETATE 25 MG/1
25 SUPPOSITORY RECTAL
Qty: 1 | Refills: 1 | Status: ACTIVE | COMMUNITY
Start: 2024-04-16 | End: 1900-01-01

## 2024-04-19 ENCOUNTER — TRANSCRIPTION ENCOUNTER (OUTPATIENT)
Age: 44
End: 2024-04-19

## 2024-04-22 ENCOUNTER — TRANSCRIPTION ENCOUNTER (OUTPATIENT)
Age: 44
End: 2024-04-22

## 2024-04-26 ENCOUNTER — RX RENEWAL (OUTPATIENT)
Age: 44
End: 2024-04-26

## 2024-04-26 RX ORDER — CLOMIPHENE CITRATE 50 MG/1
50 TABLET ORAL
Qty: 15 | Refills: 6 | Status: ACTIVE | COMMUNITY
Start: 2023-06-26 | End: 1900-01-01

## 2024-04-29 ENCOUNTER — TRANSCRIPTION ENCOUNTER (OUTPATIENT)
Age: 44
End: 2024-04-29

## 2024-05-09 LAB
ESTRADIOL SERPL-MCNC: 27 PG/ML
TESTOST SERPL-MCNC: 368 NG/DL

## 2024-05-10 ENCOUNTER — TRANSCRIPTION ENCOUNTER (OUTPATIENT)
Age: 44
End: 2024-05-10

## 2024-06-03 ENCOUNTER — RX RENEWAL (OUTPATIENT)
Age: 44
End: 2024-06-03

## 2024-06-03 RX ORDER — TESTOSTERONE CYPIONATE 200 MG/ML
200 INJECTION, SOLUTION INTRAMUSCULAR
Qty: 2 | Refills: 0 | Status: ACTIVE | COMMUNITY
Start: 2021-06-11 | End: 1900-01-01

## 2024-06-24 ENCOUNTER — RX RENEWAL (OUTPATIENT)
Age: 44
End: 2024-06-24

## 2024-06-24 RX ORDER — COCOA BUTTER, PHENYLEPHRINE HYDROCHLORIDE 2211; 6.25 MG/1; MG/1
0.25-88.44 SUPPOSITORY RECTAL
Qty: 24 | Refills: 1 | Status: ACTIVE | COMMUNITY
Start: 2024-06-24 | End: 1900-01-01

## 2024-07-05 ENCOUNTER — RX RENEWAL (OUTPATIENT)
Age: 44
End: 2024-07-05

## 2024-08-16 ENCOUNTER — RX RENEWAL (OUTPATIENT)
Age: 44
End: 2024-08-16

## 2024-10-15 ENCOUNTER — RX RENEWAL (OUTPATIENT)
Age: 44
End: 2024-10-15

## 2024-11-18 ENCOUNTER — RX RENEWAL (OUTPATIENT)
Age: 44
End: 2024-11-18

## 2024-12-10 ENCOUNTER — APPOINTMENT (OUTPATIENT)
Dept: INTERNAL MEDICINE | Facility: CLINIC | Age: 44
End: 2024-12-10
Payer: COMMERCIAL

## 2024-12-10 VITALS
SYSTOLIC BLOOD PRESSURE: 114 MMHG | RESPIRATION RATE: 18 BRPM | WEIGHT: 179 LBS | OXYGEN SATURATION: 96 % | HEIGHT: 70 IN | BODY MASS INDEX: 25.62 KG/M2 | HEART RATE: 88 BPM | TEMPERATURE: 98.5 F | DIASTOLIC BLOOD PRESSURE: 76 MMHG

## 2024-12-10 DIAGNOSIS — Z79.890 HORMONE REPLACEMENT THERAPY: ICD-10-CM

## 2024-12-10 DIAGNOSIS — E78.1 PURE HYPERGLYCERIDEMIA: ICD-10-CM

## 2024-12-10 DIAGNOSIS — I10 ESSENTIAL (PRIMARY) HYPERTENSION: ICD-10-CM

## 2024-12-10 DIAGNOSIS — E29.1 TESTICULAR HYPOFUNCTION: ICD-10-CM

## 2024-12-10 DIAGNOSIS — K76.0 FATTY (CHANGE OF) LIVER, NOT ELSEWHERE CLASSIFIED: ICD-10-CM

## 2024-12-10 DIAGNOSIS — R68.84 JAW PAIN: ICD-10-CM

## 2024-12-10 PROCEDURE — G2211 COMPLEX E/M VISIT ADD ON: CPT | Mod: NC

## 2024-12-10 PROCEDURE — 99214 OFFICE O/P EST MOD 30 MIN: CPT

## 2024-12-12 ENCOUNTER — APPOINTMENT (OUTPATIENT)
Dept: OTOLARYNGOLOGY | Facility: CLINIC | Age: 44
End: 2024-12-12
Payer: COMMERCIAL

## 2024-12-12 ENCOUNTER — TRANSCRIPTION ENCOUNTER (OUTPATIENT)
Age: 44
End: 2024-12-12

## 2024-12-12 VITALS
SYSTOLIC BLOOD PRESSURE: 128 MMHG | OXYGEN SATURATION: 96 % | TEMPERATURE: 98 F | DIASTOLIC BLOOD PRESSURE: 84 MMHG | WEIGHT: 179 LBS | BODY MASS INDEX: 25.62 KG/M2 | HEART RATE: 100 BPM | HEIGHT: 70 IN

## 2024-12-12 DIAGNOSIS — K11.6 MUCOCELE OF SALIVARY GLAND: ICD-10-CM

## 2024-12-12 LAB
ALBUMIN SERPL ELPH-MCNC: 4.3 G/DL
ALP BLD-CCNC: 56 U/L
ALT SERPL-CCNC: 33 U/L
ANION GAP SERPL CALC-SCNC: 13 MMOL/L
AST SERPL-CCNC: 18 U/L
BILIRUB SERPL-MCNC: 0.8 MG/DL
BUN SERPL-MCNC: 22 MG/DL
CALCIUM SERPL-MCNC: 9.8 MG/DL
CHLORIDE SERPL-SCNC: 104 MMOL/L
CHOLEST SERPL-MCNC: 177 MG/DL
CO2 SERPL-SCNC: 23 MMOL/L
CREAT SERPL-MCNC: 0.94 MG/DL
EGFR: 103 ML/MIN/1.73M2
ESTRADIOL SERPL-MCNC: 27 PG/ML
GLUCOSE SERPL-MCNC: 94 MG/DL
HCT VFR BLD CALC: 45.8 %
HDLC SERPL-MCNC: 77 MG/DL
HGB BLD-MCNC: 14.9 G/DL
LDLC SERPL CALC-MCNC: 84 MG/DL
NONHDLC SERPL-MCNC: 100 MG/DL
POTASSIUM SERPL-SCNC: 4.3 MMOL/L
PROT SERPL-MCNC: 6.6 G/DL
PSA SERPL-MCNC: 0.62 NG/ML
SODIUM SERPL-SCNC: 140 MMOL/L
TESTOST SERPL-MCNC: 477 NG/DL
TRIGL SERPL-MCNC: 86 MG/DL

## 2024-12-12 PROCEDURE — 99203 OFFICE O/P NEW LOW 30 MIN: CPT

## 2024-12-16 ENCOUNTER — RX RENEWAL (OUTPATIENT)
Age: 44
End: 2024-12-16

## 2024-12-17 ENCOUNTER — TRANSCRIPTION ENCOUNTER (OUTPATIENT)
Age: 44
End: 2024-12-17

## 2024-12-23 ENCOUNTER — RX RENEWAL (OUTPATIENT)
Age: 44
End: 2024-12-23

## 2025-01-31 ENCOUNTER — RX RENEWAL (OUTPATIENT)
Age: 45
End: 2025-01-31

## 2025-02-14 ENCOUNTER — RX RENEWAL (OUTPATIENT)
Age: 45
End: 2025-02-14

## 2025-03-13 ENCOUNTER — APPOINTMENT (OUTPATIENT)
Dept: INTERNAL MEDICINE | Facility: CLINIC | Age: 45
End: 2025-03-13
Payer: COMMERCIAL

## 2025-03-13 VITALS
SYSTOLIC BLOOD PRESSURE: 112 MMHG | OXYGEN SATURATION: 98 % | WEIGHT: 183.06 LBS | BODY MASS INDEX: 26.21 KG/M2 | HEART RATE: 83 BPM | HEIGHT: 70 IN | DIASTOLIC BLOOD PRESSURE: 73 MMHG | TEMPERATURE: 98.4 F

## 2025-03-13 DIAGNOSIS — R10.32 LEFT LOWER QUADRANT PAIN: ICD-10-CM

## 2025-03-13 DIAGNOSIS — F32.A DEPRESSION, UNSPECIFIED: ICD-10-CM

## 2025-03-13 PROCEDURE — G2211 COMPLEX E/M VISIT ADD ON: CPT | Mod: NC

## 2025-03-13 PROCEDURE — 99214 OFFICE O/P EST MOD 30 MIN: CPT

## 2025-03-13 RX ORDER — SERTRALINE HYDROCHLORIDE 100 MG/1
100 TABLET, FILM COATED ORAL DAILY
Qty: 90 | Refills: 6 | Status: ACTIVE | COMMUNITY
Start: 2025-03-13

## 2025-05-13 ENCOUNTER — RX RENEWAL (OUTPATIENT)
Age: 45
End: 2025-05-13

## 2025-06-26 ENCOUNTER — APPOINTMENT (OUTPATIENT)
Dept: INTERNAL MEDICINE | Facility: CLINIC | Age: 45
End: 2025-06-26
Payer: COMMERCIAL

## 2025-06-26 VITALS
DIASTOLIC BLOOD PRESSURE: 83 MMHG | HEIGHT: 70 IN | BODY MASS INDEX: 27.35 KG/M2 | HEART RATE: 62 BPM | OXYGEN SATURATION: 96 % | WEIGHT: 191 LBS | TEMPERATURE: 98.2 F | SYSTOLIC BLOOD PRESSURE: 132 MMHG

## 2025-06-26 PROBLEM — M25.571 CHRONIC PAIN OF RIGHT ANKLE: Status: ACTIVE | Noted: 2025-06-26

## 2025-06-26 PROCEDURE — 36415 COLL VENOUS BLD VENIPUNCTURE: CPT

## 2025-06-26 PROCEDURE — G2211 COMPLEX E/M VISIT ADD ON: CPT | Mod: NC

## 2025-06-26 PROCEDURE — 99214 OFFICE O/P EST MOD 30 MIN: CPT

## 2025-06-26 RX ORDER — TIRZEPATIDE 2.5 MG/.5ML
2.5 INJECTION, SOLUTION SUBCUTANEOUS
Qty: 1 | Refills: 0 | Status: ACTIVE | COMMUNITY
Start: 2025-06-26 | End: 1900-01-01

## 2025-06-27 ENCOUNTER — TRANSCRIPTION ENCOUNTER (OUTPATIENT)
Age: 45
End: 2025-06-27

## 2025-06-27 LAB
ALBUMIN SERPL ELPH-MCNC: 4.6 G/DL
ALP BLD-CCNC: 62 U/L
ALT SERPL-CCNC: 55 U/L
ANION GAP SERPL CALC-SCNC: 15 MMOL/L
AST SERPL-CCNC: 34 U/L
BILIRUB SERPL-MCNC: 0.8 MG/DL
BUN SERPL-MCNC: 18 MG/DL
CALCIUM SERPL-MCNC: 10.1 MG/DL
CHLORIDE SERPL-SCNC: 103 MMOL/L
CO2 SERPL-SCNC: 20 MMOL/L
CREAT SERPL-MCNC: 0.97 MG/DL
EGFRCR SERPLBLD CKD-EPI 2021: 99 ML/MIN/1.73M2
ESTRADIOL SERPL-MCNC: 35 PG/ML
GLUCOSE SERPL-MCNC: 87 MG/DL
HCT VFR BLD CALC: 51.3 %
HGB BLD-MCNC: 16.3 G/DL
POTASSIUM SERPL-SCNC: 4 MMOL/L
PROT SERPL-MCNC: 6.9 G/DL
PSA SERPL-MCNC: 0.94 NG/ML
SODIUM SERPL-SCNC: 139 MMOL/L
TESTOST SERPL-MCNC: 947 NG/DL

## 2025-07-07 ENCOUNTER — RX RENEWAL (OUTPATIENT)
Age: 45
End: 2025-07-07

## 2025-07-07 ENCOUNTER — TRANSCRIPTION ENCOUNTER (OUTPATIENT)
Age: 45
End: 2025-07-07

## 2025-07-18 ENCOUNTER — RX RENEWAL (OUTPATIENT)
Age: 45
End: 2025-07-18

## 2025-09-13 ENCOUNTER — RX RENEWAL (OUTPATIENT)
Age: 45
End: 2025-09-13